# Patient Record
Sex: FEMALE | Race: OTHER | Employment: PART TIME | URBAN - METROPOLITAN AREA
[De-identification: names, ages, dates, MRNs, and addresses within clinical notes are randomized per-mention and may not be internally consistent; named-entity substitution may affect disease eponyms.]

---

## 2017-02-06 ENCOUNTER — HOSPITAL ENCOUNTER (OUTPATIENT)
Dept: GENERAL RADIOLOGY | Age: 47
Discharge: HOME | End: 2017-02-06
Payer: COMMERCIAL

## 2017-02-06 DIAGNOSIS — R52 PAIN: ICD-10-CM

## 2017-02-06 PROCEDURE — 73502 X-RAY EXAM HIP UNI 2-3 VIEWS: CPT

## 2017-03-07 ENCOUNTER — OFFICE VISIT (OUTPATIENT)
Dept: UROLOGY | Age: 47
End: 2017-03-07

## 2017-03-07 VITALS
DIASTOLIC BLOOD PRESSURE: 78 MMHG | HEART RATE: 64 BPM | HEIGHT: 61 IN | SYSTOLIC BLOOD PRESSURE: 110 MMHG | WEIGHT: 180 LBS | BODY MASS INDEX: 33.99 KG/M2

## 2017-03-07 DIAGNOSIS — R39.89 BLADDER PAIN: Primary | ICD-10-CM

## 2017-03-07 LAB
BILIRUBIN, POC: ABNORMAL
BLOOD URINE, POC: ABNORMAL
CLARITY, POC: CLEAR
COLOR, POC: YELLOW
GLUCOSE URINE, POC: ABNORMAL
KETONES, POC: ABNORMAL
LEUKOCYTE EST, POC: ABNORMAL
NITRITE, POC: ABNORMAL
PH, POC: 5.5
POST VOID RESIDUAL (PVR): 64 ML
PROTEIN, POC: ABNORMAL
SPECIFIC GRAVITY, POC: <=1.005
UROBILINOGEN, POC: 0.2

## 2017-03-07 PROCEDURE — 51798 US URINE CAPACITY MEASURE: CPT | Performed by: UROLOGY

## 2017-03-07 PROCEDURE — 81003 URINALYSIS AUTO W/O SCOPE: CPT | Performed by: UROLOGY

## 2017-03-07 PROCEDURE — 99203 OFFICE O/P NEW LOW 30 MIN: CPT | Performed by: UROLOGY

## 2017-03-07 RX ORDER — FUROSEMIDE 20 MG/1
20 TABLET ORAL
COMMUNITY
End: 2017-06-08

## 2017-03-07 RX ORDER — KETOCONAZOLE 20 MG/ML
SHAMPOO TOPICAL
COMMUNITY
Start: 2017-01-20 | End: 2017-06-08

## 2017-03-07 RX ORDER — HYDROCHLOROTHIAZIDE 12.5 MG/1
12.5 TABLET ORAL
COMMUNITY

## 2017-03-07 RX ORDER — QUINIDINE GLUCONATE 324 MG
236 TABLET, EXTENDED RELEASE ORAL
COMMUNITY

## 2017-03-07 RX ORDER — FLUOCINOLONE ACETONIDE 0.1 MG/ML
SOLUTION TOPICAL
COMMUNITY
Start: 2017-01-20

## 2017-03-07 RX ORDER — METHENAMINE, SODIUM PHOSPHATE, MONOBASIC, METHYLENE BLUE, AND HYOSCYAMINE SULFATE 81.6; 40.8; 10.8; .12 MG/1; MG/1; MG/1; MG/1
81.6 TABLET, COATED ORAL 3 TIMES DAILY PRN
Qty: 21 TABLET | Refills: 0 | COMMUNITY
Start: 2017-03-07

## 2017-03-07 RX ORDER — BIOTIN 10000 MCG
10 CAPSULE ORAL
COMMUNITY

## 2017-03-07 RX ORDER — KETOCONAZOLE 20 MG/G
CREAM TOPICAL
COMMUNITY

## 2017-03-07 RX ORDER — DOCUSATE SODIUM 100 MG/1
100 CAPSULE, LIQUID FILLED ORAL
COMMUNITY
Start: 2016-01-13

## 2017-06-08 ENCOUNTER — OFFICE VISIT (OUTPATIENT)
Dept: UROLOGY | Age: 47
End: 2017-06-08

## 2017-06-08 VITALS
SYSTOLIC BLOOD PRESSURE: 120 MMHG | BODY MASS INDEX: 38.99 KG/M2 | HEART RATE: 75 BPM | DIASTOLIC BLOOD PRESSURE: 84 MMHG | WEIGHT: 206.5 LBS | HEIGHT: 61 IN

## 2017-06-08 DIAGNOSIS — R39.89 BLADDER PAIN: Primary | ICD-10-CM

## 2017-06-08 PROCEDURE — 99213 OFFICE O/P EST LOW 20 MIN: CPT | Performed by: UROLOGY

## 2017-10-03 ENCOUNTER — HOSPITAL ENCOUNTER (EMERGENCY)
Age: 47
Discharge: HOME | End: 2017-10-03
Payer: COMMERCIAL

## 2017-10-03 VITALS
HEART RATE: 84 BPM | BODY MASS INDEX: 39.65 KG/M2 | HEIGHT: 61 IN | OXYGEN SATURATION: 100 % | RESPIRATION RATE: 18 BRPM | TEMPERATURE: 97.9 F | SYSTOLIC BLOOD PRESSURE: 120 MMHG | WEIGHT: 210 LBS | DIASTOLIC BLOOD PRESSURE: 79 MMHG

## 2017-10-03 DIAGNOSIS — M25.511 ACUTE PAIN OF RIGHT SHOULDER: Primary | ICD-10-CM

## 2017-10-03 DIAGNOSIS — M54.2 NECK PAIN ON RIGHT SIDE: ICD-10-CM

## 2017-10-03 PROCEDURE — 99282 EMERGENCY DEPT VISIT SF MDM: CPT

## 2017-10-03 RX ORDER — PREDNISONE 50 MG/1
50 TABLET ORAL DAILY
Qty: 5 TABLET | Refills: 0 | Status: SHIPPED | OUTPATIENT
Start: 2017-10-03 | End: 2017-10-08

## 2017-10-03 RX ORDER — DIAZEPAM 5 MG/1
5 TABLET ORAL EVERY 8 HOURS PRN
Qty: 8 TABLET | Refills: 0 | Status: SHIPPED | OUTPATIENT
Start: 2017-10-03 | End: 2017-10-13

## 2017-10-03 ASSESSMENT — ENCOUNTER SYMPTOMS
VOMITING: 0
NAUSEA: 0
SHORTNESS OF BREATH: 0
DIARRHEA: 0
COUGH: 0
ABDOMINAL PAIN: 0

## 2017-10-03 ASSESSMENT — PAIN DESCRIPTION - ORIENTATION: ORIENTATION: RIGHT

## 2017-10-03 ASSESSMENT — PAIN SCALES - GENERAL: PAINLEVEL_OUTOF10: 8

## 2017-10-03 ASSESSMENT — PAIN DESCRIPTION - PAIN TYPE: TYPE: ACUTE PAIN;CHRONIC PAIN

## 2017-10-03 ASSESSMENT — PAIN DESCRIPTION - LOCATION: LOCATION: NECK;SHOULDER

## 2017-10-03 ASSESSMENT — PAIN DESCRIPTION - DESCRIPTORS: DESCRIPTORS: SHARP;ACHING

## 2017-10-03 NOTE — ED TRIAGE NOTES
Pt c/o neck and right shoulder pain radiating down her right arm, dizziness, all of this started 3 weeks ago, she saw her doctor for this issue and they ordered physical therapy which she is starting October 11th. She rates the pain 8/10, she last took tylenol this morning.

## 2017-10-03 NOTE — ED AVS SNAPSHOT
Diagnoses this visit     Your diagnoses were ACUTE PAIN OF RIGHT SHOULDER and NECK PAIN ON RIGHT SIDE. Visit Information     Date of Visit Department Dept Phone    10/3/2017 Liliane Waters [phone]      You were seen by     You were seen by Martha Philip PA-C. Follow-up Appointments    Below is a list of your follow-up and future appointments. This may not be a complete list as you may have made appointments directly with providers that we are not aware of or your providers may have made some for you. Please call your providers to confirm appointments. It is important to keep your appointments. Please bring your current insurance card, photo ID, co-pay, and all medication bottles to your appointment. If self-pay, payment is expected at the time of service. Follow-up Information     Follow up with Kiesha Gibbons. Schedule an appointment as soon as possible for a visit in 1 day.     Specialty:  Nurse Practitioner    Contact information:    [address] [address]          Schedule an appointment as soon as possible for a visit with Cornelius Carpio MD.    Specialty:  Orthopedic Surgery    Contact information:    [place] [address]        Future Appointments     10/7/2017 9:30 AM     Appointment with Abhay [address] 1 at [facility] ([phone])   PREPS: * Arrive 15 minutes prior * No powders, lotions or deodorants are to be worn on the day of the exam * Limit caffeine 3 days prior * May take Ibuprofen 2-4 hours prior to appointment   [address] [address]       10/11/2017  9:00 AM     Appointment with Steven Kennedy PT at [facility] ([phone])   [address] [place] [address] Way        Date Due    HIV screening is recommended for all people regardless of risk factors aged 15-65 years at least once (lifetime) who have never been HIV tested. 2/6/1984    Tetanus Combination Vaccine (1 - Tdap) 2/6/1988    Pap Smear 2/6/1990    Yearly Flu Vaccine (1) 9/1/2017    Cholesterol Screening 9/26/2022                 Care Plan Once You Return Home    This section includes instructions you will need to follow once you leave the hospital.  Your care team will discuss these with you, so you and those caring for you know how to best care for your health needs at home. This section may also include educational information about certain health topics that may be of help to you. Important Information if you smoke or are exposed to smoking       SMOKING: QUIT SMOKING. THIS IS THE MOST IMPORTANT ACTION YOU CAN TAKE TO IMPROVE YOUR CURRENT AND FUTURE HEALTH. Call the [facility] [employer] [facility] [place] NOW ([phone])    Smoking harms nonsmokers. When nonsmokers are around people who smoke, they absorb nicotine, carbon monoxide, and other ingredients of tobacco smoke. DO NOT SMOKE AROUND CHILDREN     Children exposed to secondhand smoke are at an increased risk of:  Sudden Infant Death Syndrome (SIDS), acute respiratory infections, inflammation of the middle ear, and severe asthma. Over a longer time, it causes heart disease and lung cancer. There is no safe level of exposure to secondhand smoke. Important information for a smoker       SMOKING: QUIT SMOKING. THIS IS THE MOST IMPORTANT ACTION YOU CAN TAKE TO IMPROVE YOUR CURRENT AND FUTURE HEALTH. Call the [facility] [employer] [facility] [place] NOW ([phone])    Smoking harms nonsmokers. When nonsmokers are around people who smoke, they absorb nicotine, carbon monoxide, and other ingredients of tobacco smoke.      DO NOT SMOKE AROUND CHILDREN     Children exposed to secondhand smoke are at an increased risk of:  Sudden The medical term for this type of pain is musculoskeletal pain. It can have many different causes. Sometimes the pain is caused by an injury such as a strain or sprain. Or you might have pain from using one part of your body in the same way over and over again. This is called overuse. In some cases, the cause of the pain is another health problem such as arthritis or fibromyalgia. The doctor will examine you and ask you questions about your health to help find the cause of your pain. Blood tests or imaging tests like an X-ray may also be helpful. But sometimes doctors can't find a cause of the pain. Treatment depends on your symptoms and the cause of the pain, if known. The doctor has checked you carefully, but problems can develop later. If you notice any problems or new symptoms, get medical treatment right away. Follow-up care is a key part of your treatment and safety. Be sure to make and go to all appointments, and call your doctor if you are having problems. It's also a good idea to know your test results and keep a list of the medicines you take. How can you care for yourself at home? · Rest until you feel better. · Do not do anything that makes the pain worse. Return to exercise gradually if you feel better and your doctor says it's okay. · Be safe with medicines. Read and follow all instructions on the label. ¨ If the doctor gave you a prescription medicine for pain, take it as prescribed. ¨ If you are not taking a prescription pain medicine, ask your doctor if you can take an over-the-counter medicine. · Put ice or a cold pack on the area for 10 to 20 minutes at a time to ease pain. Put a thin cloth between the ice and your skin. When should you call for help? Call your doctor now or seek immediate medical care if:  · You have new pain, or your pain gets worse. · You have new symptoms such as a fever, a rash, or chills.   Watch closely for changes in your health, and be sure to contact your

## 2017-10-03 NOTE — ED PROVIDER NOTES
Medication List as of 10/3/2017  8:01 PM      CONTINUE these medications which have NOT CHANGED    Details   SG ENTERIC COATED ASPIRIN PO Take 81 mg by mouthHistorical Med      Biotin 10 MG CAPS Take 10 mg by mouthHistorical Med      cyanocobalamin 1000 MCG tablet Take 1,000 mcg by mouthHistorical Med      diclofenac sodium (VOLTAREN) 1 % GEL Apply topically, Topical, Until Discontinued, Historical Med      docusate sodium (COLACE) 100 MG capsule Take 100 mg by mouthHistorical Med      ferrous gluconate (FERGON) 240 (27 FE) MG tablet Take 236 mg by mouthHistorical Med      fluocinolone acetonide (SYNALAR) 0.01 % external solution Apply to affected areas on scalp 2 times per day x 2 weeks and then stop only for itching, Historical Med      fluticasone propionate (FLOVENT) 100 MCG/BLIST AEPB inhaler by Nasal routeHistorical Med      hydrochlorothiazide (HYDRODIURIL) 12.5 MG tablet Take 12.5 mg by mouthHistorical Med      ketoconazole (NIZORAL) 2 % cream Apply topically, Topical, Until Discontinued, Historical Med      potassium chloride Take by mouthHistorical Med      Methen-Hyosc-Meth Blue-Na Phos (UROGESIC-BLUE) 81.6 MG TABS Take 81.6 mg by mouth 3 times daily as needed (Bladder Spasm), Disp-21 tablet, R-0Sample      Calcium Carb-Cholecalciferol 500-400 MG-UNIT CHEW Take 1 tablet by mouthHistorical Med             ALLERGIES     Review of patient's allergies indicates no known allergies. FAMILY HISTORY     History reviewed. No pertinent family history.        SOCIAL HISTORY       Social History     Social History    Marital status: Single     Spouse name: N/A    Number of children: N/A    Years of education: N/A     Social History Main Topics    Smoking status: Former Smoker    Smokeless tobacco: None    Alcohol use No    Drug use: No    Sexual activity: No     Other Topics Concern    None     Social History Narrative       SCREENINGS             PHYSICAL EXAM    (up to 7 for level 4, 8 or more for level palpation. Patient has tried Tylenol with little to no relief. Patient is not able to use NSAIDs due to gastric bypass surgery. I'll prescribe the patient steroid and muscle relaxer. I instructed her to follow-up with her family physician in one to 2 days. I advised her to return to the emergency department for any new or worsening symptoms. Patient verbalized understanding of this plan. Patient stable and ready for discharge. PROCEDURES:  Unless otherwise noted below, none     Procedures      FINAL IMPRESSION      1. Acute pain of right shoulder    2.  Neck pain on right side          DISPOSITION/PLAN   DISPOSITION Decision to Discharge        Maryanne Hoskins (electronically signed)  Attending Emergency Physician       Stephon Florian PA-C  10/03/17 2009

## 2017-10-07 ENCOUNTER — HOSPITAL ENCOUNTER (OUTPATIENT)
Dept: WOMENS IMAGING | Age: 47
Discharge: HOME | End: 2017-10-07
Payer: COMMERCIAL

## 2017-10-07 DIAGNOSIS — Z12.31 ENCOUNTER FOR SCREENING MAMMOGRAM FOR BREAST CANCER: ICD-10-CM

## 2017-10-07 PROCEDURE — G0202 SCR MAMMO BI INCL CAD: HCPCS

## 2017-10-11 ENCOUNTER — HOSPITAL ENCOUNTER (OUTPATIENT)
Dept: PHYSICAL THERAPY | Age: 47
Setting detail: THERAPIES SERIES
Discharge: HOME | End: 2017-10-11
Payer: COMMERCIAL

## 2017-10-11 PROCEDURE — 97162 PT EVAL MOD COMPLEX 30 MIN: CPT

## 2017-10-11 PROCEDURE — 97110 THERAPEUTIC EXERCISES: CPT

## 2017-10-11 ASSESSMENT — PAIN SCALES - GENERAL: PAINLEVEL_OUTOF10: 3

## 2017-10-11 ASSESSMENT — PAIN DESCRIPTION - PROGRESSION: CLINICAL_PROGRESSION_2: GRADUALLY IMPROVING

## 2017-10-11 ASSESSMENT — PAIN DESCRIPTION - ORIENTATION
ORIENTATION: POSTERIOR;LOWER
ORIENTATION_2: RIGHT;OUTER

## 2017-10-11 ASSESSMENT — PAIN DESCRIPTION - DESCRIPTORS
DESCRIPTORS_2: ACHING
DESCRIPTORS: ACHING

## 2017-10-11 ASSESSMENT — PAIN DESCRIPTION - INTENSITY: RATING_2: 5

## 2017-10-11 ASSESSMENT — PAIN DESCRIPTION - ONSET: ONSET_2: AWAKENED FROM SLEEP

## 2017-10-11 ASSESSMENT — PAIN DESCRIPTION - LOCATION
LOCATION_2: NECK
LOCATION: HEAD

## 2017-10-11 ASSESSMENT — PAIN DESCRIPTION - PAIN TYPE
TYPE: ACUTE PAIN
TYPE_2: ACUTE PAIN

## 2017-10-11 ASSESSMENT — PAIN DESCRIPTION - DURATION: DURATION_2: CONTINUOUS

## 2017-10-11 ASSESSMENT — PAIN DESCRIPTION - FREQUENCY: FREQUENCY: INTERMITTENT

## 2017-10-11 NOTE — PROGRESS NOTES
[address] EVALUATION    [x] [address]  [] [facility] CENTER            of [address]      Date: 10/11/2017  Patient: Kira Downey  : 1970  ACCT #: [de-identified]    Referring Practitioner: Keven Pierre NP     Referral Date : 17    Diagnosis: dizziness     Treatment Diagnosis: imbalance, dizziness, neck pain   Onset Date:  (3 wks ago )  PT Insurance Information: [facility]   Total # of Visits Approved: 30 Per Physician Order       History   has a past medical history of Arthritis and Hypertension. has a past surgical history that includes Hysterectomy (2016); Gastric bypass surgery (2014); and  section (). Subjective  Subjective: Pt reports onset of Rt sided neck pain ~ 3 wks ago. then felt mild dizziness and off balance. c/o lightheadedness with mild headache. Reports blurred vision. Symptoms exacerbated with neck rotation to Rt. Worse with changing positions.     Additional Pertinent Hx: OA, HTN, gastric bypass  Pain Screening  Patient Currently in Pain: Yes  Pain Assessment  Pain Assessment: 0-10  Pain Level: 3 (Range: 0-8/10 )  Pain Type: Acute pain  Pain Location: Head  Pain Orientation: Posterior, Lower  Pain Descriptors: Aching  Pain Frequency: Intermittent  Pain Intervention(s): Medication (see eMar)  Multiple Pain Sites: Yes  Social/Functional History  Lives With: Daughter (16 yo, 24 yo )  Type of Home: House  Home Layout: One level  ADL Assistance: Independent  Homemaking Assistance: Independent  Homemaking Responsibilities: Yes  Ambulation Assistance: Independent  Transfer Assistance: Independent  Active : Yes  Mode of Transportation: Car  Occupation: Full time employment  Type of occupation:    Leisure & Hobbies: arts and crafts, movies        [place]:  Initial Episode:  3 wks ago, neck pain with off balance feeling    Testing Performed: none at this time    Dizziness Description: off balance, lightheaded     Frequency/ Duration: ~15 seconds for dizziness    Circumstance: change of position, head movements especially Rt rotation    Recent Visual Changes: blurred, difficulty focusing    Auditory Changes: denies    Medications: prednisone for neck     PAIN   Location:   Pain Location: Head  Pain Rating (0-10 pain scale):  Pain Level: 3 (Range: 0-8/10 )  Pain Description:  Pain Descriptors: Aching  Action:  [x] Acceptable for treatment  []  Other:      Objective  Vision  Vision: Impaired (difficulty focusing, blurred )  Hearing  Hearing: Within functional limits  Observation/Palpation  Posture: Good  Observation: no dysmetria noted, good dexterity   Spine  Cervical: flex 37, ext 52, Rt SB 34, Lt SB 44   Strength RLE  Comment: 4+/5 grossly   Strength LLE  Comment: 4+/5 grossly   Strength RUE  Comment: 4+/5 grossly   Strength LUE  Comment: 4+/5 grossly           PROM LLE (degrees)  LLE PROM: WFL  PROM RLE (degrees)  RLE PROM: WFL        Sensation  Overall Sensation Status: Impaired (c/o intermittent numbness \"around my face\" )     Transfers  Sit to Stand: Independent  Stand to sit:  Independent  Ambulation 1  Surface: carpet  Device: No Device  Assistance: Independent  Quality of Gait: no sig deviations   Distance: unlimited within clinic         Balance  Single Leg Stance R Leg: 3  Single Leg Stance L Leg: 10  Comments: feet together with eyes closed with sway   Jim Balance Score: 55  Dynamic Gait Total Score: 21    Orthostatic BP:   Lying 123/58, sitting 129/66, standing 126/62     Positional Vertigo:   Intensity (0-5) Nystagmus Duration   Sit -> Rt Sidelying 0 []     Rt Sidelying -> Sit 5 []  ~20\"   Sit -> Lt Sidelying 0 []     Lt Sidelying -> Sit 5 []  ~20\"     Oculomotor Exam:   NT WFL Symptoms Intensity (0-5) Nystagmus Duration   Spontaneous []  [x]    []     Smooth Pursuit []  []  Lightheaded  5 [] none noted ~30\"   Saccades []  [x]    []     VOR Cancellation [x]  []    [] Complexity  History: personal factors: stress; contributing PMH: OA, HTN, gastric bypass  Exam: musculoskeletal, pain and sensory, vestibular systems involved impacting ADLs, driving, ROM, balance   Clinical Presentation: evolving, complicated   Patient Education: vestibular systems, cervicogenic dizziness, HEP   Barriers to Learning: no   REQUIRES PT FOLLOW UP: Yes  Patient Goal:  Patient goals : \"help with the dizziness, feeling of being off balance, lightheadedness, Pain on Rt shoulder and neck\"     Treatment Plan:  [x] Adaptation [x] Habituation  [] Substitution   [x] Balance Training   [] Other:     Patient Education:  vestibular systems, cervicogenic dizziness, HEP   Pt verbalized/demonstrated good understanding:     [x] Yes         [] No, pt required further clarification. G Code:       NA    Plan:  Plan  Times per week: 2  Plan weeks: 6  Current Treatment Recommendations: Strengthening, ROM, Balance Training, Functional Mobility Training, Gait Training, Neuromuscular Re-education, Manual Therapy - Soft Tissue Mobilization, Manual Therapy - Joint Manipulation, Pain Management, Home Exercise Program, Safety Education & Training, Patient/Caregiver Education & Training, Modalities       Evaluation and patient rights have been reviewed and patient agrees with plan of care.   Yes  [x]  No  [] Explain:     Signature: Electronically signed by Leticia Talavera PT on 10/11/2017 at 10:07 AM    PT Individual Minutes  Time In: 7755  Time Out: 1000  Minutes: 57  Timed Code Treatment Minutes: 10 Minutes (8 ther ex, 2 neuro )  Procedure Minutes: 47          [place] Fall Risk Assessment    Risk Factor Scale  Score   History of Falls [] Yes  [x] No 25  0 0   Secondary Diagnosis [] Yes  [x] No 15  0 0   Ambulatory Aid [] Furniture  [] Crutches/cane/walker  [x] None/bedrest/wheelchair/nurse 30  15  0 0   IV/Heparin Lock [] Yes  [x] No 20  0 0   Gait/Transferring [] Impaired  [] Weak  [x] Normal/bedrest/immobile 20  10  0 0

## 2017-10-17 ENCOUNTER — HOSPITAL ENCOUNTER (OUTPATIENT)
Dept: PHYSICAL THERAPY | Age: 47
Setting detail: THERAPIES SERIES
Discharge: HOME | End: 2017-10-17
Payer: COMMERCIAL

## 2017-10-17 PROCEDURE — 97110 THERAPEUTIC EXERCISES: CPT

## 2017-10-17 PROCEDURE — 97112 NEUROMUSCULAR REEDUCATION: CPT

## 2017-10-17 PROCEDURE — 97140 MANUAL THERAPY 1/> REGIONS: CPT

## 2017-10-17 ASSESSMENT — PAIN DESCRIPTION - DESCRIPTORS: DESCRIPTORS: SORE;ACHING

## 2017-10-17 ASSESSMENT — PAIN SCALES - GENERAL: PAINLEVEL_OUTOF10: 7

## 2017-10-17 ASSESSMENT — PAIN DESCRIPTION - PAIN TYPE: TYPE: ACUTE PAIN

## 2017-10-17 NOTE — PROGRESS NOTES
[address] [address]  Outpatient Physical Therapy    Treatment Note        Date: 10/17/2017  Patient: Zara Minor  : 1970  ACCT #: [de-identified]  Referring Practitioner: Kale Parkinson NP   Diagnosis: dizziness     Visit Information:  PT Visit Information  Onset Date:  (3 wks ago )  PT Insurance Information: [employer]   Total # of Visits Approved: 30  Total # of Visits to Date: 2  No Show: 0  Canceled Appointment: 0  Progress Note Counter:     Subjective: Pt reports continued soreness in R UT area. No reports of dizziness but c/o lightheadedness.       HEP Compliance:  [x] Good [] Fair [] Poor [] Reports not doing due to:    Vital Signs  Patient Currently in Pain: Yes   Pain Screening  Patient Currently in Pain: Yes  Pain Assessment  Pain Level: 7  Pain Type: Acute pain  Pain Descriptors: Sore;Aching    OBJECTIVE:   Exercises  Exercise 1: smooth pursuits 30s/2 horizontal/vertical only (2/5 dizziness)  Exercise 2: chin tucks 5s/10   Exercise 3: UT stretch 30s/3   Exercise 4: levator stretch B 3/30''  Exercise 5: Barrel stretch 3/30''  Exercise 8: B/D exercises x3 bilaterally (3/5 dizzy/lightheadedness)  Exercise 9: Cervical AROM 5''x10 ea  Exercise 10: Scap retractions 5''x 10 in seated  Exercise 20: HEP: cervical AROM      Strength: [x] NT  [] MMT completed:     ROM: [x] NT  [] ROM measurements:       Manual:   Manual therapy  Manual traction: KT for R UT inhibition/decrease pain 2'  Soft Tissue Mobalization: STM to UT and paraspinals to decrease pain and tightness 8'      *Indicates exercise, modality, or manual techniques to be initiated when appropriate    Assessment:   Activity Tolerance  Activity Tolerance: Patient limited by pain, Patient Tolerated treatment well    Body structures, Functions, Activity limitations: Decreased functional mobility , Decreased strength, Decreased ROM, Decreased sensation, Decreased balance, Decreased coordination  Assessment: Initiated cervical AROM exercises to decrease pain and improve ROM. Mild reports of dizziness with vertical head movements, decreased with spotting techniques. Mild discomfort with stretching activities. VCs to decrease overpressure and maintain in pain free range. Slight improvement with spotting technique with habituation exercises. Treatment Diagnosis: imbalance, dizziness, neck pain   Prognosis: Good       Goals:  Short term goals  Time Frame for Short term goals: 2 wks   Short term goal 1: Independent with HEP   Short term goal 2: Decrease symptoms by 50% to allow change of position easily with increased confidence     Long term goals  Time Frame for Long term goals : 5 wks   Long term goal 1: Improve cervical ROM WFL to allow increased ease with driving   Long term goal 2: Smooth pursuits x60\" without increased symptoms to allow improved ability to focus   Long term goal 3: DGI 24/24 to demonstrate improved dynamic balance and decreased risk for falls   Long term goal 4: ABC >/= 95% to demonstrate improved tolerance and confidence with activities and balance   Progress toward goals: Symptoms, ROM     POST-PAIN       Pain Rating (0-10 pain scale):   6/10   Location and pain description same as pre-treatment unless indicated. Action: [] NA   [x] Perform HEP  [x] Meds as prescribed  [] Modalities as prescribed   [] Call Physician     Frequency/Duration:  Plan  Times per week: 2  Plan weeks: 6  Specific instructions for Next Treatment: Assess KT/ manual from last visit  Current Treatment Recommendations: Strengthening, ROM, Balance Training, Functional Mobility Training, Gait Training, Neuromuscular Re-education, Manual Therapy - Soft Tissue Mobilization, Manual Therapy - Joint Manipulation, Pain Management, Home Exercise Program, Safety Education & Training, Patient/Caregiver Education & Training, Modalities     Pt to continue current HEP. See objective section for any therapeutic exercise changes, additions or modifications this date. PT Individual Minutes  Time In: 0900  Time Out: 4145  Minutes: 57  Timed Code Treatment Minutes: 57 Minutes  Procedure Minutes:0  Man: 10  There ex: 30  Neuro: 17    Signature:  Electronically signed by Jayesh Pineda PTA on 10/17/17 at 12:06 PM

## 2017-10-19 ENCOUNTER — HOSPITAL ENCOUNTER (OUTPATIENT)
Dept: PHYSICAL THERAPY | Age: 47
Setting detail: THERAPIES SERIES
Discharge: HOME | End: 2017-10-19
Payer: COMMERCIAL

## 2017-10-19 PROCEDURE — 97112 NEUROMUSCULAR REEDUCATION: CPT

## 2017-10-19 PROCEDURE — 97110 THERAPEUTIC EXERCISES: CPT

## 2017-10-19 PROCEDURE — 97140 MANUAL THERAPY 1/> REGIONS: CPT

## 2017-10-19 ASSESSMENT — PAIN SCALES - GENERAL: PAINLEVEL_OUTOF10: 7

## 2017-10-19 ASSESSMENT — PAIN DESCRIPTION - DESCRIPTORS: DESCRIPTORS: SORE

## 2017-10-19 ASSESSMENT — PAIN DESCRIPTION - PAIN TYPE: TYPE: ACUTE PAIN

## 2017-10-19 ASSESSMENT — PAIN DESCRIPTION - LOCATION: LOCATION: HEAD;NECK

## 2017-10-19 NOTE — PROGRESS NOTES
[address] [address]  Outpatient Physical Therapy    Treatment Note        Date: 10/19/2017  Patient: Jackie Greenwood  : 1970  ACCT #: [de-identified]  Referring Practitioner: Carlos King NP   Diagnosis: dizziness     Visit Information:  PT Visit Information  Onset Date:  (3 wks ago )  PT Insurance Information: [employer]   Total # of Visits Approved: 30  Total # of Visits to Date: 3  No Show: 0  Canceled Appointment: 0  Progress Note Counter: 3/12    Subjective: Reports not sleeping well d/t pain. Feels KT helped a little but did not stay on very well.  Sees PCP tomorrow for follow up appt     HEP Compliance:  [x] Good [] Fair [] Poor [] Reports not doing due to:    Vital Signs  Patient Currently in Pain: Yes   Pain Screening  Patient Currently in Pain: Yes  Pain Assessment  Pain Level: 7  Pain Type: Acute pain  Pain Location: Head;Neck  Pain Descriptors: Sore    OBJECTIVE:   Exercises  Exercise 1: smooth pursuits 30s/2 horizontal/vertical only (2/5 \"lightheaded\")  Exercise 2: chin tucks 5s/10   Exercise 3: UT stretch 30s/3   Exercise 4: levator stretch B 3/30''  Exercise 5: Barrel stretch 3/30''  Exercise 6: Static stand: tandem, FT, FA, EC with min postural sway  Exercise 7: Foam: static stand EO, EC, tandem  Exercise 8: B/D exercises x3 bilaterally (3/5 dizzy/lightheadedness)  Exercise 10: Scap retractions 5''x 10 in seated  Exercise 20: HEP: B/D exerices, levator str              Strength: [x] NT  [] MMT completed:        ROM: [x] NT  [] ROM measurements:    Modalities:  Modalities  Moist heat: 10' to B UT to decrease pain and tightness     *Indicates exercise, modality, or manual techniques to be initiated when appropriate    Assessment:   Activity Tolerance  Activity Tolerance: Patient Tolerated treatment well    Body structures, Functions, Activity limitations: Decreased functional mobility , Decreased strength, Decreased ROM, Decreased sensation, Decreased balance, Decreased coordination  Assessment: Continued B/D exercises to decrease symptoms with education on targeting and avoid strenuous movement with supine to sit transfer. Min postural sway with foam activities however good righting reactions observed. Ended tx with HP to decrease tightness and pain, decrease pain reported after tx. Treatment Diagnosis: imbalance, dizziness, neck pain   Prognosis: Good  Patient Education: HEP, proper body mechanics    Goals:  Short term goals  Time Frame for Short term goals: 2 wks   Short term goal 1: Independent with HEP   Short term goal 2: Decrease symptoms by 50% to allow change of position easily with increased confidence     Long term goals  Time Frame for Long term goals : 5 wks   Long term goal 1: Improve cervical ROM WFL to allow increased ease with driving   Long term goal 2: Smooth pursuits x60\" without increased symptoms to allow improved ability to focus   Long term goal 3: DGI 24/24 to demonstrate improved dynamic balance and decreased risk for falls   Long term goal 4: ABC >/= 95% to demonstrate improved tolerance and confidence with activities and balance   Progress toward goals: Pain, cervical ROM    POST-PAIN       Pain Rating (0-10 pain scale):   5/10   Location and pain description same as pre-treatment unless indicated. Action: [] NA   [x] Perform HEP  [x] Meds as prescribed  [] Modalities as prescribed   [] Call Physician     Frequency/Duration:  Plan  Times per week: 2  Plan weeks: 6  Current Treatment Recommendations: Strengthening, ROM, Balance Training, Functional Mobility Training, Gait Training, Neuromuscular Re-education, Manual Therapy - Soft Tissue Mobilization, Manual Therapy - Joint Manipulation, Pain Management, Home Exercise Program, Safety Education & Training, Patient/Caregiver Education & Training, Modalities     Pt to continue current HEP. See objective section for any therapeutic exercise changes, additions or modifications this date.          PT Individual Minutes  Time In:

## 2017-10-24 ENCOUNTER — HOSPITAL ENCOUNTER (OUTPATIENT)
Dept: PHYSICAL THERAPY | Age: 47
Setting detail: THERAPIES SERIES
Discharge: HOME | End: 2017-10-24
Payer: COMMERCIAL

## 2017-10-24 PROCEDURE — 97110 THERAPEUTIC EXERCISES: CPT

## 2017-10-24 PROCEDURE — 97140 MANUAL THERAPY 1/> REGIONS: CPT

## 2017-10-24 PROCEDURE — 97112 NEUROMUSCULAR REEDUCATION: CPT

## 2017-10-24 ASSESSMENT — PAIN DESCRIPTION - LOCATION: LOCATION: NECK

## 2017-10-24 ASSESSMENT — PAIN DESCRIPTION - DESCRIPTORS: DESCRIPTORS: ACHING

## 2017-10-24 ASSESSMENT — PAIN SCALES - GENERAL: PAINLEVEL_OUTOF10: 7

## 2017-10-24 ASSESSMENT — PAIN DESCRIPTION - ORIENTATION: ORIENTATION: RIGHT

## 2017-10-24 NOTE — PROGRESS NOTES
[id number]  Outpatient Physical Therapy    Treatment Note        Date: 10/24/2017  Patient: Patricia Becerra  : 1970  ACCT #: [de-identified]  Referring Practitioner: Sumanth Bunch NP   Diagnosis: dizziness     Visit Information:  PT Visit Information  Onset Date:  (3 wks ago )  PT Insurance Information: [facility]   Total # of Visits Approved: 30  Progress Note Counter:     Subjective: Pt reports rt UT AND cervical pain, bad weekend for pain and sleeping, also reports falling on stairs when trying to turn to fast at work. HEP Compliance:  [x] Good [] Fair [] Poor [] Reports not doing due to:    Vital Signs  Patient Currently in Pain: Yes   Pain Screening  Patient Currently in Pain: Yes  Pain Assessment  Pain Level: 7  Pain Location: Neck  Pain Orientation: Right  Pain Descriptors: Aching (pinching)    OBJECTIVE:   Exercises  Exercise 1: smooth pursuits 30s/3 horizontal/vertical only (2/5 \"lightheaded\")  Exercise 2: chin tucks 5s/10   Exercise 3: UT stretch 30s/3 B  Exercise 4: levator stretch B 3/30''  Exercise 5: Barrel stretch 3/30''  Exercise 7: Foam: static stand EO, EC, tandem and with head turns  Exercise 8: B/D exercises x3 bilaterally ( dizzy/lightheadedness, to the rt, rolling from supine to S/L)not as bad to the lt  Exercise 9: Cervical AROM 5''x10 ea  Exercise 10: Scap retractions 5''x 10 in seated  Exercise 11: tuning board with horizontal smooth pursuits, min sway    Strength: [x] NT  [] MMT completed:     ROM: [x] NT  [] ROM measurements:      Manual:   Manual therapy  Soft Tissue Mobalization: STM to UT and paraspinals to decrease pain and tightness 8', inc pain and HA withflexion    Modalities:  Modalities  Moist heat: 10' to B UT to decrease pain and tightness     *Indicates exercise, modality, or manual techniques to be initiated when appropriate    Assessment:       Body structures, Functions, Activity limitations: Decreased functional mobility , Decreased strength, Decreased ROM, Decreased sensation, Decreased balance, Decreased coordination  Assessment: Pt demo's decreasing sx's with BD ex's, Rt >Lt,however continued with supine to sit, vc's for decreased speed/ technique,  demo'd increased dizziness with smooth pursuits with increased speed,trialed compliant surface with smooth pursuits with  minimnal sway. 90deg turns with compensatory strategies with decreased dizziness. Pt also c/o of pending HA during manual with flexion, improved with head unsupported on mat. Treatment Diagnosis: imbalance, dizziness, neck pain      Goals:  Short term goals  Time Frame for Short term goals: 2 wks   Short term goal 1: Independent with HEP   Short term goal 2: Decrease symptoms by 50% to allow change of position easily with increased confidence     Long term goals  Time Frame for Long term goals : 5 wks   Long term goal 1: Improve cervical ROM WFL to allow increased ease with driving   Long term goal 2: Smooth pursuits x60\" without increased symptoms to allow improved ability to focus   Long term goal 3: DGI 24/24 to demonstrate improved dynamic balance and decreased risk for falls   Long term goal 4: ABC >/= 95% to demonstrate improved tolerance and confidence with activities and balance   Progress toward goals:ROM , strength, reducing sx's    POST-PAIN       Pain Rating (0-10 pain scale):  3 /10   Location and pain description same as pre-treatment unless indicated.    Action: [] NA   [x] Perform HEP  [] Meds as prescribed  [x] Modalities as prescribed   [] Call Physician     Frequency/Duration:  Plan  Times per week: 2  Plan weeks: 6  Current Treatment Recommendations: Strengthening, ROM, Balance Training, Functional Mobility Training, Gait Training, Neuromuscular Re-education, Manual Therapy - Soft Tissue Mobilization, Manual Therapy - Joint Manipulation, Pain Management, Home Exercise Program, Safety Education & Training, Patient/Caregiver Education & Training, Modalities     Pt to

## 2017-10-26 ENCOUNTER — HOSPITAL ENCOUNTER (OUTPATIENT)
Dept: PHYSICAL THERAPY | Age: 47
Setting detail: THERAPIES SERIES
Discharge: HOME | End: 2017-10-26
Payer: COMMERCIAL

## 2017-10-26 PROCEDURE — 97110 THERAPEUTIC EXERCISES: CPT

## 2017-10-26 PROCEDURE — 97140 MANUAL THERAPY 1/> REGIONS: CPT

## 2017-10-26 PROCEDURE — 97112 NEUROMUSCULAR REEDUCATION: CPT

## 2017-10-26 PROCEDURE — G0283 ELEC STIM OTHER THAN WOUND: HCPCS

## 2017-10-26 ASSESSMENT — PAIN DESCRIPTION - LOCATION: LOCATION: NECK

## 2017-10-26 ASSESSMENT — PAIN DESCRIPTION - PAIN TYPE: TYPE: ACUTE PAIN

## 2017-10-26 ASSESSMENT — PAIN SCALES - GENERAL: PAINLEVEL_OUTOF10: 4

## 2017-10-26 ASSESSMENT — PAIN DESCRIPTION - DESCRIPTORS: DESCRIPTORS: ACHING

## 2017-10-26 ASSESSMENT — PAIN DESCRIPTION - ORIENTATION: ORIENTATION: RIGHT

## 2017-10-31 ENCOUNTER — HOSPITAL ENCOUNTER (OUTPATIENT)
Dept: PHYSICAL THERAPY | Age: 47
Setting detail: THERAPIES SERIES
Discharge: HOME | End: 2017-10-31
Payer: COMMERCIAL

## 2017-10-31 PROCEDURE — 97140 MANUAL THERAPY 1/> REGIONS: CPT

## 2017-10-31 PROCEDURE — 97112 NEUROMUSCULAR REEDUCATION: CPT

## 2017-10-31 PROCEDURE — 97110 THERAPEUTIC EXERCISES: CPT

## 2017-10-31 ASSESSMENT — PAIN DESCRIPTION - DESCRIPTORS: DESCRIPTORS: ACHING

## 2017-10-31 ASSESSMENT — PAIN DESCRIPTION - LOCATION: LOCATION: NECK

## 2017-10-31 ASSESSMENT — PAIN SCALES - GENERAL: PAINLEVEL_OUTOF10: 7

## 2017-10-31 NOTE — PROGRESS NOTES
[facility]   Outpatient Physical Therapy   Treatment Note  [x] [address]  [] [facility]  Date: 10/31/2017  Patient: Gladys Blanca  : 1970  ACCT #: [de-identified]  Referring Practitioner: Marybelle Denver, NP   Diagnosis: dizziness     Visit Information:  PT Visit Information  Onset Date:  (3 wks ago )  [facility]   Total # of Visits Approved: 30  Total # of Visits to Date: 6  No Show: 0  Canceled Appointment: 0  Progress Note Counter:     SUBJECTIVE:   Subjective  Subjective: Pt reports 50% improvement with balance and dizziness. Primary concern is neck pain this date. HEP Compliance:  [x] Good [] Fair [] Poor [] Reports not doing due to:    PAIN   Location:   Pain Location: Neck (posterior and Rt side )  Pain Rating (0-10 pain scale):  Pain Level: 7  Pain Description:  Pain Descriptors: Aching  Action:  [x] Acceptable for treatment  []  Other:    OBJECTIVE:   Exercises  Exercise 1: smooth pursuits 30s horizontal with good quality and without increased symptoms, vertical with good tracking with 2/5 symptoms   Exercise 2: chin tucks 5s/10, with nod x10   Exercise 3: UT stretch 30s/3 B with VCs for neutral rotation   Exercise 4: levator stretch B 3/30''  Exercise 5: Barrel stretch 3/30''  Exercise 10: Scap retractions 5''x 10 in seated  Exercise 14:  VOR 30s with VCs to decrease range, 1/5 dizziness with correct technique 30s/2   Exercise 20: HEP: continue current, add vertical smooth pursuits     Manual: []  NA   Manual therapy  PROM: PROM in supine   Muscle energy: METs to R to increase SB  Soft Tissue Mobalization: STM to R UT to decrease pain and tightness, suboccipital release, MFR Rt UT and SCM     Modalities: [x]  NA    Mobility: [x]  NA    Strength: [x] NT    ROM: [] NT   Spine  Cervical: flex 34, ext 51, Rt SB 43, Lt SB 45      HEP  Continue with current Home Exercise Program.  See Objective section

## 2017-11-02 ENCOUNTER — HOSPITAL ENCOUNTER (OUTPATIENT)
Dept: PHYSICAL THERAPY | Age: 47
Setting detail: THERAPIES SERIES
Discharge: HOME | End: 2017-11-02
Payer: COMMERCIAL

## 2017-11-02 PROCEDURE — G0283 ELEC STIM OTHER THAN WOUND: HCPCS

## 2017-11-02 PROCEDURE — 97112 NEUROMUSCULAR REEDUCATION: CPT

## 2017-11-02 PROCEDURE — 97140 MANUAL THERAPY 1/> REGIONS: CPT

## 2017-11-02 ASSESSMENT — PAIN DESCRIPTION - PAIN TYPE: TYPE: ACUTE PAIN

## 2017-11-02 ASSESSMENT — PAIN DESCRIPTION - LOCATION: LOCATION: NECK

## 2017-11-02 ASSESSMENT — PAIN DESCRIPTION - ORIENTATION: ORIENTATION: RIGHT

## 2017-11-02 ASSESSMENT — PAIN DESCRIPTION - DESCRIPTORS: DESCRIPTORS: ACHING

## 2017-11-02 ASSESSMENT — PAIN SCALES - GENERAL: PAINLEVEL_OUTOF10: 6

## 2017-11-02 NOTE — PROGRESS NOTES
Modalities     Pt to continue current HEP. See objective section for any therapeutic exercise changes, additions or modifications this date.          PT Individual Minutes  Time In: 0901  Time Out: 1004  Minutes: 63  Timed Code Treatment Minutes: 53 Minutes  Procedure Minutes: 10  Neuro: 30  Man: 23    Signature:  Electronically signed by Ismael Urrutia PTA on 11/2/17 at 10:07 AM

## 2017-11-07 ENCOUNTER — HOSPITAL ENCOUNTER (OUTPATIENT)
Dept: PHYSICAL THERAPY | Age: 47
Setting detail: THERAPIES SERIES
Discharge: HOME | End: 2017-11-07
Payer: COMMERCIAL

## 2017-11-07 PROCEDURE — 97112 NEUROMUSCULAR REEDUCATION: CPT

## 2017-11-07 PROCEDURE — 97140 MANUAL THERAPY 1/> REGIONS: CPT

## 2017-11-07 PROCEDURE — 97110 THERAPEUTIC EXERCISES: CPT

## 2017-11-07 ASSESSMENT — PAIN SCALES - GENERAL: PAINLEVEL_OUTOF10: 4

## 2017-11-07 ASSESSMENT — PAIN DESCRIPTION - DESCRIPTORS: DESCRIPTORS: ACHING

## 2017-11-07 ASSESSMENT — PAIN DESCRIPTION - ORIENTATION: ORIENTATION: POSTERIOR;RIGHT

## 2017-11-07 ASSESSMENT — PAIN DESCRIPTION - PAIN TYPE: TYPE: ACUTE PAIN

## 2017-11-07 ASSESSMENT — PAIN DESCRIPTION - LOCATION: LOCATION: NECK

## 2017-11-07 NOTE — PROGRESS NOTES
treatment well    Body structures, Functions, Activity limitations: Decreased functional mobility , Decreased strength, Decreased ROM, Decreased sensation, Decreased balance, Decreased coordination  Assessment: Pt continues to report min pain with cervical flexion activities. Improved dynamic balance with DGI tasks and higher level gait drills. VCs to stay within pain free range with AROM exercises, specifically with flexion. Treatment Diagnosis: imbalance, dizziness, neck pain   Prognosis: Good       Goals:  Short term goals  Time Frame for Short term goals: 2 wks   Short term goal 1: Independent with HEP   Short term goal 2: Decrease symptoms by 50% to allow change of position easily with increased confidence     Long term goals  Time Frame for Long term goals : 5 wks   Long term goal 1: Improve cervical ROM WFL to allow increased ease with driving   Long term goal 2: Smooth pursuits x60\" without increased symptoms to allow improved ability to focus   Long term goal 3: DGI 24/24 to demonstrate improved dynamic balance and decreased risk for falls   Long term goal 4: ABC >/= 95% to demonstrate improved tolerance and confidence with activities and balance   Progress toward goals: Pain    POST-PAIN       Pain Rating (0-10 pain scale):   2/10   Location and pain description same as pre-treatment unless indicated. Action: [] NA   [x] Perform HEP  [x] Meds as prescribed  [] Modalities as prescribed   [] Call Physician     Frequency/Duration:  Plan  Times per week: 2  Plan weeks: 6  Current Treatment Recommendations: Strengthening, ROM, Balance Training, Functional Mobility Training, Gait Training, Neuromuscular Re-education, Manual Therapy - Soft Tissue Mobilization, Manual Therapy - Joint Manipulation, Pain Management, Home Exercise Program, Safety Education & Training, Patient/Caregiver Education & Training, Modalities     Pt to continue current HEP.   See objective section for any therapeutic exercise changes, additions or modifications this date.          PT Individual Minutes  Time In: 1720  Time Out: 1009  Minutes: 66  Timed Code Treatment Minutes: 56 Minutes  Procedure Minutes: 10  Man: 15  There ex: 30  Neuro: 11    Signature:  Electronically signed by Elizabeth Marcial PTA on 11/7/17 at 9:59 AM

## 2017-11-09 ENCOUNTER — HOSPITAL ENCOUNTER (OUTPATIENT)
Dept: PHYSICAL THERAPY | Age: 47
Setting detail: THERAPIES SERIES
Discharge: HOME | End: 2017-11-09
Payer: COMMERCIAL

## 2017-11-09 PROCEDURE — 97110 THERAPEUTIC EXERCISES: CPT

## 2017-11-09 PROCEDURE — 97140 MANUAL THERAPY 1/> REGIONS: CPT

## 2017-11-09 PROCEDURE — 97112 NEUROMUSCULAR REEDUCATION: CPT

## 2017-11-09 ASSESSMENT — PAIN SCALES - GENERAL: PAINLEVEL_OUTOF10: 4

## 2017-11-09 ASSESSMENT — PAIN DESCRIPTION - PAIN TYPE: TYPE: ACUTE PAIN

## 2017-11-09 ASSESSMENT — PAIN DESCRIPTION - DESCRIPTORS: DESCRIPTORS: ACHING

## 2017-11-09 ASSESSMENT — PAIN DESCRIPTION - LOCATION: LOCATION: NECK

## 2017-11-09 ASSESSMENT — PAIN DESCRIPTION - ORIENTATION: ORIENTATION: RIGHT

## 2017-11-09 NOTE — PROGRESS NOTES
Decreased coordination  Assessment: Initiated smooth pursuits and VOR on tuning board to challenge balance. Min postural sway noted but not significantly. Continued head turns for DGI tasks with good tolerance. Treatment Diagnosis: imbalance, dizziness, neck pain   Prognosis: Good       Goals:  Short term goals  Time Frame for Short term goals: 2 wks   Short term goal 1: Independent with HEP   Short term goal 2: Decrease symptoms by 50% to allow change of position easily with increased confidence     Long term goals  Time Frame for Long term goals : 5 wks   Long term goal 1: Improve cervical ROM WFL to allow increased ease with driving   Long term goal 2: Smooth pursuits x60\" without increased symptoms to allow improved ability to focus   Long term goal 3: DGI 24/24 to demonstrate improved dynamic balance and decreased risk for falls   Long term goal 4: ABC >/= 95% to demonstrate improved tolerance and confidence with activities and balance   Progress toward goals: Pain    POST-PAIN       Pain Rating (0-10 pain scale):   2/10   Location and pain description same as pre-treatment unless indicated. Action: [] NA   [] Perform HEP  [x] Meds as prescribed  [] Modalities as prescribed   [] Call Physician     Frequency/Duration:  Plan  Times per week: 2  Plan weeks: 6  Current Treatment Recommendations: Strengthening, ROM, Balance Training, Functional Mobility Training, Gait Training, Neuromuscular Re-education, Manual Therapy - Soft Tissue Mobilization, Manual Therapy - Joint Manipulation, Pain Management, Home Exercise Program, Safety Education & Training, Patient/Caregiver Education & Training, Modalities     Pt to continue current HEP. See objective section for any therapeutic exercise changes, additions or modifications this date.          PT Individual Minutes  Time In: 6975  Time Out: 1011  Minutes: 67  Timed Code Treatment Minutes: 56 Minutes  Procedure Minutes: 10  Man: 20  There ex: 10  Neuro: 26    Signature:  Electronically signed by Richy Diaz PTA on 11/9/17 at 9:53 AM

## 2017-11-14 ENCOUNTER — HOSPITAL ENCOUNTER (OUTPATIENT)
Dept: PHYSICAL THERAPY | Age: 47
Setting detail: THERAPIES SERIES
Discharge: HOME | End: 2017-11-14
Payer: COMMERCIAL

## 2017-11-14 PROCEDURE — G0283 ELEC STIM OTHER THAN WOUND: HCPCS

## 2017-11-14 PROCEDURE — 97140 MANUAL THERAPY 1/> REGIONS: CPT

## 2017-11-14 PROCEDURE — 97110 THERAPEUTIC EXERCISES: CPT

## 2017-11-14 ASSESSMENT — PAIN SCALES - GENERAL: PAINLEVEL_OUTOF10: 4

## 2017-11-14 ASSESSMENT — PAIN DESCRIPTION - PAIN TYPE: TYPE: ACUTE PAIN

## 2017-11-14 ASSESSMENT — PAIN DESCRIPTION - LOCATION: LOCATION: NECK

## 2017-11-14 ASSESSMENT — PAIN DESCRIPTION - DESCRIPTORS: DESCRIPTORS: ACHING

## 2017-11-14 ASSESSMENT — PAIN DESCRIPTION - ORIENTATION: ORIENTATION: RIGHT

## 2017-11-14 NOTE — PROGRESS NOTES
strength, Decreased ROM, Decreased sensation, Decreased balance, Decreased coordination  Assessment: Pt continues to report difficulties with R rotation and flexion activities. Increased resistance to Lats/rows with VCs to improve upright posture. Info given regarding IDN as pt displays symptoms that could be relieved to improve function. Treatment Diagnosis: imbalance, dizziness, neck pain   Prognosis: Good       Goals:  Short term goals  Time Frame for Short term goals: 2 wks   Short term goal 1: Independent with HEP   Short term goal 2: Decrease symptoms by 50% to allow change of position easily with increased confidence     Long term goals  Time Frame for Long term goals : 5 wks   Long term goal 1: Improve cervical ROM WFL to allow increased ease with driving   Long term goal 2: Smooth pursuits x60\" without increased symptoms to allow improved ability to focus   Long term goal 3: DGI 24/24 to demonstrate improved dynamic balance and decreased risk for falls   Long term goal 4: ABC >/= 95% to demonstrate improved tolerance and confidence with activities and balance   Progress toward goals: Pain    POST-PAIN       Pain Rating (0-10 pain scale):   3/10   Location and pain description same as pre-treatment unless indicated. Action: [] NA   [] Perform HEP  [x] Meds as prescribed  [] Modalities as prescribed   [] Call Physician     Frequency/Duration:  Plan  Times per week: 2  Plan weeks: 6  Current Treatment Recommendations: Strengthening, ROM, Balance Training, Functional Mobility Training, Gait Training, Neuromuscular Re-education, Manual Therapy - Soft Tissue Mobilization, Manual Therapy - Joint Manipulation, Pain Management, Home Exercise Program, Safety Education & Training, Patient/Caregiver Education & Training, Modalities     Pt to continue current HEP. See objective section for any therapeutic exercise changes, additions or modifications this date.          PT Individual Minutes  Time In: 0900  Time Out:

## 2017-11-16 ENCOUNTER — HOSPITAL ENCOUNTER (OUTPATIENT)
Dept: PHYSICAL THERAPY | Age: 47
Setting detail: THERAPIES SERIES
Discharge: HOME | End: 2017-11-16
Payer: COMMERCIAL

## 2017-11-16 PROCEDURE — G0283 ELEC STIM OTHER THAN WOUND: HCPCS

## 2017-11-16 PROCEDURE — 97140 MANUAL THERAPY 1/> REGIONS: CPT

## 2017-11-16 PROCEDURE — 97110 THERAPEUTIC EXERCISES: CPT

## 2017-11-16 ASSESSMENT — PAIN SCALES - GENERAL: PAINLEVEL_OUTOF10: 4

## 2017-11-16 ASSESSMENT — PAIN DESCRIPTION - ORIENTATION: ORIENTATION: RIGHT

## 2017-11-16 ASSESSMENT — PAIN DESCRIPTION - PAIN TYPE: TYPE: ACUTE PAIN

## 2017-11-16 ASSESSMENT — PAIN DESCRIPTION - DESCRIPTORS: DESCRIPTORS: ACHING

## 2017-11-16 ASSESSMENT — PAIN DESCRIPTION - LOCATION: LOCATION: NECK

## 2017-11-16 NOTE — PROGRESS NOTES
[address] [address]  Outpatient Physical Therapy    Treatment Note        Date: 2017  Patient: Porsche Olmedo  : 1970  ACCT #: [de-identified]  Referring Practitioner: Neel Decker NP   Diagnosis: dizziness     Visit Information:  PT Visit Information  Onset Date:  (3 wks ago )  PT Insurance Information: [employer]   Total # of Visits Approved: 30  Total # of Visits to Date: 11  No Show: 0  Canceled Appointment: 0  Progress Note Counter:     Subjective: Pt reports not dizziness/imbalance with laying down/getting up. HEP Compliance:  [x] Good [] Fair [] Poor [] Reports not doing due to:    Vital Signs  Patient Currently in Pain: Yes   Pain Screening  Patient Currently in Pain: Yes  Pain Assessment  Pain Level: 4  Pain Type: Acute pain  Pain Location: Neck  Pain Orientation: Right  Pain Descriptors: Aching    OBJECTIVE:   Exercises  Exercise 1: smooth pursuits 30s V/H in standing  Exercise 2: Cervical retraction with ext 5''x10, chin nod with rotation 3''x10  Exercise 5: Barrel stretch 330''  Exercise 12: Lats/rows RTB x10  Exercise 14: VOR x1 30''/2 Vand H in stand (1/5 dizzy); VCs to decrease head movement speed and range  Exercise 15: Corner stretch 330''  Exercise 16: Chest pulls 3 way YTB x12  Exercise 18:  Shoulder rolls F/R x10  Exercise 19: VOR x2 horizontal only (2/5 dizzy)  Exercise 20: HEP: lats/rows         Strength: [x] NT  [] MMT completed:     ROM: [] NT  [x] ROM measurements:      Spine  Cervical: flex 30 with pain, ext 54deg, R SB 42, L SB 39  Manual:   Manual therapy  Soft Tissue Mobalization: MFR to B UT, STM cervical paraspinals, light distraction  IDN to normalize tissue dysfunction and improve NM mechanics (see homeostatic chart to be scanned into EMR upon D/C for points needled) x10 min     Modalities:  Modalities  Moist heat: 10' to B UT to decrease pain and tightness  E-stim (parameters): Estim to cervical 10' with HP in seated to decrease pain     *Indicates exercise, modality, or manual techniques to be initiated when appropriate    Assessment: Body structures, Functions, Activity limitations: Decreased functional mobility , Decreased strength, Decreased ROM, Decreased sensation, Decreased balance, Decreased coordination  Assessment: Initiated VOR x2 with mild symptoms. VCs to keep exercises and stretches within pain free and symptom free range. ROM continues to be limited by pain. Treatment Diagnosis: imbalance, dizziness, neck pain   Prognosis: Good       Goals:  Short term goals  Time Frame for Short term goals: 2 wks   Short term goal 1: Independent with HEP   Short term goal 2: Decrease symptoms by 50% to allow change of position easily with increased confidence     Long term goals  Time Frame for Long term goals : 5 wks   Long term goal 1: Improve cervical ROM WFL to allow increased ease with driving   Long term goal 2: Smooth pursuits x60\" without increased symptoms to allow improved ability to focus   Long term goal 3: DGI 24/24 to demonstrate improved dynamic balance and decreased risk for falls   Long term goal 4: ABC >/= 95% to demonstrate improved tolerance and confidence with activities and balance   Progress toward goals: ROM    POST-PAIN       Pain Rating (0-10 pain scale):   3/10   Location and pain description same as pre-treatment unless indicated. Action: [] NA   [x] Perform HEP  [x] Meds as prescribed  [] Modalities as prescribed   [] Call Physician     Frequency/Duration:  Plan  Times per week: 2  Plan weeks: 6  Current Treatment Recommendations: Strengthening, ROM, Balance Training, Functional Mobility Training, Gait Training, Neuromuscular Re-education, Manual Therapy - Soft Tissue Mobilization, Manual Therapy - Joint Manipulation, Pain Management, Home Exercise Program, Safety Education & Training, Patient/Caregiver Education & Training, Modalities     Pt to continue current HEP.   See objective section for any therapeutic exercise changes, additions or modifications this date.          PT Individual Minutes  Time In: 0900  Time Out: 1010  Minutes: 70  Timed Code Treatment Minutes: 60 Minutes  Procedure Minutes: 10  There ex: 25  Man: 20     Signature:  Electronically signed by Bruce Ram PTA on 11/16/17 at 9:12 AM

## 2017-11-22 ENCOUNTER — HOSPITAL ENCOUNTER (OUTPATIENT)
Dept: PHYSICAL THERAPY | Age: 47
Setting detail: THERAPIES SERIES
Discharge: HOME | End: 2017-11-22
Payer: COMMERCIAL

## 2017-11-22 PROCEDURE — 97140 MANUAL THERAPY 1/> REGIONS: CPT

## 2017-11-22 PROCEDURE — 97110 THERAPEUTIC EXERCISES: CPT

## 2017-11-22 ASSESSMENT — PAIN SCALES - GENERAL: PAINLEVEL_OUTOF10: 2

## 2017-11-22 ASSESSMENT — PAIN DESCRIPTION - LOCATION: LOCATION: NECK

## 2017-11-22 ASSESSMENT — PAIN DESCRIPTION - ORIENTATION: ORIENTATION: RIGHT

## 2017-11-22 NOTE — PROGRESS NOTES
[address]     Ph: [phone]  Fax: [phone]    [] Certification  [] Recertification []  Plan of Care  [x] Progress Note [] Discharge      To:  Referring Practitioner: Patricio Canavan, NP       From:  Jose Garcia, PT  Patient: Ondina David     : 1970  Diagnosis: dizziness, neck pain on R side     Date: 2017  Treatment Diagnosis: imbalance, dizziness, neck pain      Progress Report Period from:  10/11/2017  to 2017    Total # of Visits to Date: 12   No Show: 0    Canceled Appointment: 0     OBJECTIVE:   Short Term Goals - Time Frame for Short term goals: 2 wks     Goals Current/Discharge status  Met   Short term goal 1: Independent with HEP   ongoing [] yes  [x] no   Short term goal 2: Decrease symptoms by 50% to allow change of position easily with increased confidence  (D/C 17)  > 50% [x] yes  [] no   Short term goal 3: The pt will have a decrease in NDI score >/=4 points in order to increase functional activity tolerance   [] yes  [x] no   Short term goal 4: The pt will demonstrate improved B periscapular strength >/=4+/5 in order to increase postural awareness in sitting   Strength DIANDRA  Comment: MT/LT 4/5   Strength PATRICK  Comment: MT/LT 4/5    [] yes  [x] no   Short term goal 5:  The pt will demo improved deep neck flexor endurance >/=10 seconds in order to decrease pain with functional activity Other: Deep neck flexor endurance 6 sec  []  yes  [x]  no     Long Term Goals - Time Frame for Long term goals : 5 wks   Goals Current/ Discharge status Met   Long term goal 1: Improve cervical ROM WFL to allow increased ease with driving  Spine  Cervical: flex 45, ext 40, SB L 45, R 40, Rot R 65 L 60      [] yes  [x] no   Long term goal 2: Smooth pursuits x60\" without increased symptoms to allow improved ability to focus  (D/C 17) WFL [x] yes  [] no   Long term goal 3: DGI  to demonstrate improved dynamic balance and decreased risk for falls  (D/C 11/22/17 ) 24/24 [x] yes  [] no   Long term goal 4: ABC >/= 95% to demonstrate improved tolerance and confidence with activities and balance  (D/C 11/22/17) 98.13% [x] yes  [] no     Body structures, Functions, Activity limitations: Decreased functional mobility , Decreased strength, Decreased ROM, Decreased sensation, Decreased balance, Decreased coordination  Assessment: Pt has met all goals for dizziness at this time however conts to have R sided neck pain with decreased cervical arom, periscapular and deep neck flexor strength, and postural awareness. These impairments cont to limit her functional abilities by 12% including her abilities to perfrom sleeping, driving, and ADL's without pain or limiattions. Prognosis: Good  Discharge Recommendations: Continue to assess pending progress    PLAN:   Frequency/Duration:  Plan  Times per week: 1-2  Plan weeks: 3 additional   Current Treatment Recommendations: Strengthening, ROM, Balance Training, Functional Mobility Training, Gait Training, Neuromuscular Re-education, Manual Therapy - Soft Tissue Mobilization, Manual Therapy - Joint Manipulation, Pain Management, Home Exercise Program, Safety Education & Training, Patient/Caregiver Education & Training, Modalities (IDN)             ? Patient Status:[x] Continue/ Initiate plan of Care    [] Discharge PT. Recommend pt continue with HEP. [] Additional visits requested, Please re-certify for additional visits:       Signature: Electronically signed by Melvina Cobb PT on 11/22/17 at 12:08 PM    If you have any questions or concerns, please don't hesitate to call. Thank you for your referral.    I have reviewed this plan of care and certify a need for medically necessary rehabilitation services.     Physician Signature:__________________________________________________________  Date:  Please sign and return

## 2017-11-22 NOTE — PROGRESS NOTES
[address] [address]  Outpatient Physical Therapy    Treatment Note        Date: 2017  Patient: Jackie Greenwood  : 1970  ACCT #: [de-identified]  Referring Practitioner: Carlos King NP   Diagnosis: dizziness, neck pain on R side    Visit Information:  PT Visit Information  Onset Date:  (3 wks ago )  PT Insurance Information: [employer]   Total # of Visits Approved: 30  Total # of Visits to Date: 12  No Show: 0  Canceled Appointment: 0  Progress Note Counter: +3-6=15-18    Subjective: Pt reports not having any dizziness, feeling much better, conts to have minimal cervical pain that was somewhat relieved after last tx session. HEP Compliance:  [x] Good [] Fair [] Poor [] Reports not doing due to:    Vital Signs  Patient Currently in Pain: Yes   Pain Screening  Patient Currently in Pain: Yes  Pain Assessment  Pain Assessment: 0-10  Pain Level: 2  Pain Location: Neck  Pain Orientation: Right    OBJECTIVE:     Strength: [] NT  [x] MMT completed:  Strength RUE  Comment: MT/LT 4/5   Strength LUE  Comment: MT/LT 4/5   Strength Other  Other: Deep neck flexor endurance 6 sec     ROM: [] NT  [x] ROM measurements:  Spine  Cervical: flex 45, ext 40, SB L 45, R 40, Rot R 65 L 60     Manual:   Manual therapy  Soft Tissue Mobalization: STM cervical paraspinals, SCM's, UT's, LS, sub-occipital release; IDN to normalize tissue dysfunction and improve NM mechanics (see homeostatic chart to be scanned into EMR upon D/C for points needled) x10 min; Total manual 23 min     *Indicates exercise, modality, or manual techniques to be initiated when appropriate    Assessment:    Body structures, Functions, Activity limitations: Decreased functional mobility , Decreased strength, Decreased ROM, Decreased sensation, Decreased balance, Decreased coordination  Assessment: Pt has met all goals for dizziness at this time however conts to have R sided neck pain with decreased cervical arom, periscapular and deep neck flexor strength, and postural awareness. These impairments cont to limit her functional abilities by 12% including her abilities to perfrom sleeping, driving, and ADL's without pain or limiattions. Treatment Diagnosis: imbalance, dizziness, neck pain   Prognosis: Good     Goals:  Short term goals  Time Frame for Short term goals: 2 wks   Short term goal 1: Independent with HEP   Short term goal 2: Decrease symptoms by 50% to allow change of position easily with increased confidence  (D/C 11/22/17)  Short term goal 3: The pt will have a decrease in NDI score >/=4 points in order to increase functional activity tolerance  Short term goal 4: The pt will demonstrate improved B periscapular strength >/=4+/5 in order to increase postural awareness in sitting   Short term goal 5: The pt will demo improved deep neck flexor endurance >/=10 seconds in order to decrease pain with functional activity    Long term goals  Time Frame for Long term goals : 5 wks   Long term goal 1: Improve cervical ROM WFL to allow increased ease with driving   Long term goal 2: Smooth pursuits x60\" without increased symptoms to allow improved ability to focus  (D/C 11/22/17)  Long term goal 3: DGI 24/24 to demonstrate improved dynamic balance and decreased risk for falls  (D/C 11/22/17 )  Long term goal 4: ABC >/= 95% to demonstrate improved tolerance and confidence with activities and balance  (D/C 11/22/17)  Progress toward goals: see PN    POST-PAIN       Pain Rating (0-10 pain scale): 2/10   Location and pain description same as pre-treatment unless indicated.    Action: [] NA   [x] Perform HEP  [x] Meds as prescribed  [x] Modalities as prescribed   [] Call Physician     Frequency/Duration:  Plan  Times per week: 1-2  Plan weeks: 3 additional   Specific instructions for Next Treatment: cont with IDN  Current Treatment Recommendations: Strengthening, ROM, Balance Training, Functional Mobility Training, Gait Training, Neuromuscular Re-education, Manual Therapy - Soft Tissue Mobilization, Manual Therapy - Joint Manipulation, Pain Management, Home Exercise Program, Safety Education & Training, Patient/Caregiver Education & Training, Modalities (IDN)     Pt to continue current HEP. See objective section for any therapeutic exercise changes, additions or modifications this date.     PT Individual Minutes  Time In: 1119  Time Out: 1201  Minutes: 38  Timed Code Treatment Minutes: 38 Minutes  Procedure Minutes: n/a    Signature:  Electronically signed by Jacqueline Vargas PT on 11/22/17 at 12:12 PM

## 2017-11-24 ENCOUNTER — HOSPITAL ENCOUNTER (OUTPATIENT)
Dept: PHYSICAL THERAPY | Age: 47
Setting detail: THERAPIES SERIES
Discharge: HOME | End: 2017-11-24
Payer: COMMERCIAL

## 2017-11-24 NOTE — PROGRESS NOTES
[address]       Physical Therapy  Cancellation/No-show Note  Patient Name:  Angelic Monge  :  1970   Date:  2017  Referring Practitioner: Leopold Bathe, NP   Diagnosis: dizziness, neck pain on R side    Visit Information:  PT Visit Information  Onset Date:  (3 wks ago )  PT Insurance Information: [employer]   Total # of Visits Approved: 30  Total # of Visits to Date: 12  No Show: 0  Canceled Appointment: 1  Progress Note Counter: +3-6=15-18- pt cx    For today's appointment patient:  [x]  Cancelled  []  Rescheduled appointment  []  No-show   []  Called pt to remind of next appointment     Reason given by patient:  []  Patient ill  []  Conflicting appointment  [x]  No transportation    []  Conflict with work  []  No reason given  []  Other:       Comments:       Signature: Electronically signed by Manohar Jane PT on 17 at 7:48 AM

## 2017-12-02 ENCOUNTER — HOSPITAL ENCOUNTER (OUTPATIENT)
Dept: ULTRASOUND IMAGING | Age: 47
Discharge: HOME | End: 2017-12-02
Payer: COMMERCIAL

## 2017-12-02 DIAGNOSIS — M79.89 LEG SWELLING: ICD-10-CM

## 2017-12-02 PROCEDURE — 93970 EXTREMITY STUDY: CPT

## 2017-12-05 ENCOUNTER — HOSPITAL ENCOUNTER (OUTPATIENT)
Dept: PHYSICAL THERAPY | Age: 47
Setting detail: THERAPIES SERIES
Discharge: HOME | End: 2017-12-05
Payer: COMMERCIAL

## 2017-12-05 PROCEDURE — G0283 ELEC STIM OTHER THAN WOUND: HCPCS

## 2017-12-05 PROCEDURE — 97110 THERAPEUTIC EXERCISES: CPT

## 2017-12-05 PROCEDURE — 97140 MANUAL THERAPY 1/> REGIONS: CPT

## 2017-12-05 ASSESSMENT — PAIN SCALES - GENERAL: PAINLEVEL_OUTOF10: 4

## 2017-12-05 ASSESSMENT — PAIN DESCRIPTION - ORIENTATION: ORIENTATION: RIGHT;LEFT

## 2017-12-05 ASSESSMENT — PAIN DESCRIPTION - DESCRIPTORS: DESCRIPTORS: ACHING;SORE

## 2017-12-05 ASSESSMENT — PAIN DESCRIPTION - LOCATION: LOCATION: NECK

## 2017-12-05 NOTE — PROGRESS NOTES
[address] [address]  Outpatient Physical Therapy    Treatment Note        Date: 2017  Patient: Sekou Ro  : 1970  ACCT #: [de-identified]  Referring Practitioner: Yolie Lindquist NP   Diagnosis: dizziness, neck pain on R side    Visit Information:  PT Visit Information  Onset Date:  (3 wks ago )  PT [facility] [place] [facility]   Total # of Visits Approved: 30  Total # of Visits to Date: 13  No Show: 0  Canceled Appointment: 2  Progress Note Counter:     Subjective: Pt reports contd pain R>L neck, compliant with hep      HEP Compliance:  [x] Good [] Fair [] Poor [] Reports not doing due to:    Vital Signs  Patient Currently in Pain: Yes   Pain Screening  Patient Currently in Pain: Yes  Pain Assessment  Pain Assessment: 0-10  Pain Level: 4  Pain Location: Neck  Pain Orientation: Right;Left (R>L)  Pain Descriptors: Aching; Sore    OBJECTIVE:   Exercises  Exercise 1: prone horiz abd IR/ER x10   Exercise 2: tband rows/lats GTB 2x10   Exercise 3: UT stretch 30\"x3   Exercise 5: prone LT x10 L 1  Exercise 6: Chest pulls RTB 3 way x15  Exercise 7: B tband ER RTB x15     Strength: [x] NT  [] MMT completed:    ROM: [x] NT  [] ROM measurements:     Manual:   Manual therapy  Soft Tissue Mobalization: STM cervical paraspinals, SCM's, UT's, LS, sub-occipital release; IDN to normalize tissue dysfunction and improve NM mechanics (see homeostatic chart to be scanned into EMR upon D/C for points needled) x15 min; Total manual 24 min     Modalities:  Modalities  Moist heat: 10' to B UT to decrease pain and tightness  E-stim (parameters): Estim to cervical 10' with HP in seated to decrease pain     *Indicates exercise, modality, or manual techniques to be initiated when appropriate    Assessment:    Body structures, Functions, Activity limitations: Decreased functional mobility , Decreased strength, Decreased ROM, Decreased sensation, Decreased balance, Decreased coordination  Assessment: Pt reports having min Signature:  Electronically signed by Raissa Mendoza, PT on 12/5/17 at 9:55 AM

## 2017-12-14 ENCOUNTER — HOSPITAL ENCOUNTER (OUTPATIENT)
Dept: PHYSICAL THERAPY | Age: 47
Setting detail: THERAPIES SERIES
Discharge: HOME | End: 2017-12-14
Payer: COMMERCIAL

## 2017-12-14 NOTE — PROGRESS NOTES
[address]       Physical Therapy  Cancellation/No-show Note  Patient Name:  Kira Downey  :  1970   Date:  2017  Referring Practitioner: Keven Pierre NP   Diagnosis: dizziness, neck pain on R side    Visit Information:  PT Visit Information  Onset Date:  (3 wks ago )  PT Insurance Information: [employer]   Total # of Visits Approved: 30  Total # of Visits to Date: 13  No Show: 0  Canceled Appointment: 3  Progress Note Counter:  (cx on )    For today's appointment patient:  [x]  Cancelled  []  Rescheduled appointment  []  No-show   []  Called pt to remind of next appointment     Reason given by patient:  []  Patient ill  []  Conflicting appointment  []  No transportation    [x]  Conflict with work  []  No reason given  []  Other:       Comments:       Signature: Electronically signed by Robert Brantley PTA on 17 at 7:41 AM

## 2017-12-18 ENCOUNTER — HOSPITAL ENCOUNTER (OUTPATIENT)
Dept: PHYSICAL THERAPY | Age: 47
Setting detail: THERAPIES SERIES
Discharge: HOME | End: 2017-12-18
Payer: COMMERCIAL

## 2017-12-18 PROCEDURE — G0283 ELEC STIM OTHER THAN WOUND: HCPCS

## 2017-12-18 PROCEDURE — 97140 MANUAL THERAPY 1/> REGIONS: CPT

## 2017-12-18 PROCEDURE — 97110 THERAPEUTIC EXERCISES: CPT

## 2017-12-18 ASSESSMENT — PAIN DESCRIPTION - DESCRIPTORS: DESCRIPTORS: ACHING

## 2017-12-18 ASSESSMENT — PAIN DESCRIPTION - LOCATION: LOCATION: NECK

## 2017-12-18 ASSESSMENT — PAIN DESCRIPTION - ORIENTATION: ORIENTATION: RIGHT;LEFT

## 2017-12-18 ASSESSMENT — PAIN SCALES - GENERAL: PAINLEVEL_OUTOF10: 4

## 2017-12-20 ENCOUNTER — HOSPITAL ENCOUNTER (OUTPATIENT)
Dept: PHYSICAL THERAPY | Age: 47
Setting detail: THERAPIES SERIES
Discharge: HOME | End: 2017-12-20
Payer: COMMERCIAL

## 2017-12-20 PROCEDURE — 97110 THERAPEUTIC EXERCISES: CPT

## 2017-12-20 PROCEDURE — G0283 ELEC STIM OTHER THAN WOUND: HCPCS

## 2017-12-20 PROCEDURE — 97140 MANUAL THERAPY 1/> REGIONS: CPT

## 2017-12-20 ASSESSMENT — PAIN DESCRIPTION - ORIENTATION: ORIENTATION: RIGHT

## 2017-12-20 ASSESSMENT — PAIN DESCRIPTION - LOCATION: LOCATION: NECK;HEAD

## 2017-12-20 ASSESSMENT — PAIN DESCRIPTION - DESCRIPTORS: DESCRIPTORS: ACHING

## 2017-12-20 ASSESSMENT — PAIN SCALES - GENERAL: PAINLEVEL_OUTOF10: 6

## 2017-12-20 NOTE — PROGRESS NOTES
Individual Minutes  Time In: 3345  Time Out: 1008  Minutes: 50  Timed Code Treatment Minutes: 38 Minutes  Procedure Minutes: 10 minutes     Signature:  Electronically signed by Paola Hay PT on 12/20/17 at 9:56 AM

## 2017-12-26 ENCOUNTER — HOSPITAL ENCOUNTER (OUTPATIENT)
Dept: PHYSICAL THERAPY | Age: 47
Setting detail: THERAPIES SERIES
Discharge: HOME | End: 2017-12-26
Payer: COMMERCIAL

## 2017-12-26 PROCEDURE — G0283 ELEC STIM OTHER THAN WOUND: HCPCS

## 2017-12-26 PROCEDURE — 97140 MANUAL THERAPY 1/> REGIONS: CPT

## 2017-12-26 PROCEDURE — 97110 THERAPEUTIC EXERCISES: CPT

## 2017-12-26 ASSESSMENT — PAIN DESCRIPTION - LOCATION: LOCATION: NECK

## 2017-12-26 ASSESSMENT — PAIN SCALES - GENERAL: PAINLEVEL_OUTOF10: 2

## 2017-12-26 ASSESSMENT — PAIN DESCRIPTION - DESCRIPTORS: DESCRIPTORS: ACHING

## 2017-12-26 ASSESSMENT — PAIN DESCRIPTION - ORIENTATION: ORIENTATION: RIGHT

## 2017-12-26 NOTE — PROGRESS NOTES
[address] [address]  Outpatient Physical Therapy    Treatment Note        Date: 2017  Patient: [place] Tuan  : 1970  ACCT #: [de-identified]  Referring Practitioner: Nicolasa Blanco NP   Diagnosis: dizziness, neck pain on R side    Visit Information:  PT Visit Information  Onset Date:  (3 wks ago )  PT Insurance Information: [employer]   Total # of Visits Approved: 30  Total # of Visits to Date: 16  No Show: 0  Canceled Appointment: 3  Progress Note Counter:     Subjective: Pt reports decreased pain this date, was a little sore after last appt however decreased after a few days with decreased frequency of HA's; contd intermimttent difficulty with driving with turning head to the right d/t pain. Current fucntional level 90%. HEP Compliance:  [x] Good [] Fair [] Poor [] Reports not doing due to:    Vital Signs  Patient Currently in Pain: Yes   Pain Screening  Patient Currently in Pain: Yes  Pain Assessment  Pain Assessment: 0-10  Pain Level: 2  Pain Location: Neck  Pain Orientation: Right  Pain Descriptors: Aching    OBJECTIVE:   Exercises  Exercise 1: prone horiz abd IR/ER x15  Exercise 2: tband rows/lats RTB 2x15   Exercise 4: corner stretch 30\"x3  Exercise 5: prone LT x15 L 1  Exercise 6: Chest pulls RTB horizontal x15  Exercise 7: B tband ER RTB 2x15     Strength: [] NT  [x] MMT completed:  Strength RUE  Comment: MT/LT 4/5 Rhomboid 4+/5  Strength LUE  Comment: MT/LT/ Rhomboid 4/5        ROM: [] NT  [x] ROM measurements:     Spine  Cervical: flex 45, ext 40 SB L 40 R 35 Rot R 64 L 76  (pain with flexion and R rotation )    Manual:   Manual therapy  PROM: R SCM contract/relax stretch 30\"/10\" x3  Soft Tissue Mobalization: STM cervical paraspinals, SCM's, UT's, LS, sub-occipital release R>L; IDN to normalize tissue dysfunction and improve NM mechanics (see homeostatic chart to be scanned into EMR upon D/C for points needled) x8 min;  Total manual 20 min     Modalities:  Modalities  Moist heat: 10'

## 2017-12-28 ENCOUNTER — HOSPITAL ENCOUNTER (OUTPATIENT)
Dept: PHYSICAL THERAPY | Age: 47
Setting detail: THERAPIES SERIES
Discharge: HOME | End: 2017-12-28
Payer: COMMERCIAL

## 2017-12-28 NOTE — PROGRESS NOTES
[address]        Physical Therapy  Cancellation/No-show Note  Patient Name:  Jose Alfredo Leiva  :  1970   Date:  2017  Referring Practitioner: Yaneth Branch NP   Diagnosis: dizziness, neck pain on R side    Visit Information:  PT Visit Information  Onset Date:  (3 wks ago )  PT Insurance Information: [employer]   Total # of Visits Approved: 30  Total # of Visits to Date: 12  Canceled Appointment: 4  Progress Note Counter:  ( Cx 17)    For today's appointment patient:  [x]  Cancelled  []  Rescheduled appointment  []  No-show   []  Called pt to remind of next appointment     Reason given by patient:  []  Patient ill  []  Conflicting appointment  []  No transportation    [x]  Conflict with work  []  No reason given  []  Other:       Comments:       Signature: Electronically signed by Ivette Boothe PT on 17 at 8:32 AM

## 2018-01-19 ENCOUNTER — CLINICAL DOCUMENTATION (OUTPATIENT)
Dept: PHYSICAL THERAPY | Age: 48
End: 2018-01-19

## 2018-02-08 NOTE — PROGRESS NOTES
[address]     Ph: [phone]  Fax: [phone]    [] Certification  [] Recertification []  Plan of Care  [] Progress Note [x] Discharge      To:  Referring Practitioner: Nancy Gonzalez NP       From:  David Lopez, PT, DPT  Patient: Melissa Valladares     : 1970  Diagnosis: dizziness, neck pain on R side     Date: 2018  Treatment Diagnosis: neck pain      Progress Report Period from:  2017  to 2018    Total # of Visits to Date: 16   No Show: 1    Canceled Appointment: 4     OBJECTIVE:   Short Term Goals - Time Frame for Short term goals: 2 wks     Goals Current/Discharge status  Met   Short term goal 1: Independent with HEP   Indep [x] yes  [] no   Short term goal 3: The pt will have a decrease in NDI score >/=4 points in order to increase functional activity tolerance  NT secondary to unexpected D/C [] yes  [] no   Short term goal 4: The pt will demonstrate improved B periscapular strength >/=4+/5 in order to increase postural awareness in sitting   Strength RUE  Comment: MT/LT 4/5 Rhomboid 4+/5  Strength LUE  Comment: MT/LT/ Rhomboid 4/5 (17) [x] yes  [x] no   Short term goal 5: The pt will demo improved deep neck flexor endurance >/=10 seconds in order to decrease pain with functional activity NT secondary to unexpected D/C []  yes  []  no     Long Term Goals - Time Frame for Long term goals : 5 wks   Goals Current/ Discharge status Met   Long term goal 1: Improve cervical ROM WFL to allow increased ease with driving  Cervical: flex 45, ext 40 SB L 40 R 35 Rot R 64 L 76  (pain with flexion and R rotation ) (17) [x] yes  [x] no     Assessment: Pt failed to return to PT after 18, unable to determine functional outcomes at this time. ? Patient Status:[] Continue/ Initiate plan of Care    [x] Discharge PT. Recommend pt continue with HEP.      [] Additional visits requested, Please re-certify for additional visits:          Signature: Electronically signed by Coleen Blum PT on 2/8/18 at 2:12 PM      If you have any questions or concerns, please don't hesitate to call. Thank you for your referral.    I have reviewed this plan of care and certify a need for medically necessary rehabilitation services.     Physician Signature:__________________________________________________________  Date:  Please sign and return

## 2018-03-18 ENCOUNTER — HOSPITAL ENCOUNTER (EMERGENCY)
Age: 48
Discharge: HOME | End: 2018-03-18
Attending: FAMILY MEDICINE
Payer: COMMERCIAL

## 2018-03-18 VITALS
OXYGEN SATURATION: 98 % | WEIGHT: 207 LBS | DIASTOLIC BLOOD PRESSURE: 80 MMHG | TEMPERATURE: 98 F | RESPIRATION RATE: 16 BRPM | HEART RATE: 74 BPM | HEIGHT: 61 IN | BODY MASS INDEX: 39.08 KG/M2 | SYSTOLIC BLOOD PRESSURE: 125 MMHG

## 2018-03-18 DIAGNOSIS — R31.9 HEMATURIA, UNSPECIFIED TYPE: ICD-10-CM

## 2018-03-18 DIAGNOSIS — N30.01 ACUTE HEMORRHAGIC CYSTITIS: Primary | ICD-10-CM

## 2018-03-18 LAB
BACTERIA: ABNORMAL /HPF
BILIRUBIN URINE: NEGATIVE
BLOOD, URINE: ABNORMAL
CLARITY: ABNORMAL
COLOR: YELLOW
EPITHELIAL CELLS, UA: ABNORMAL /HPF
GLUCOSE URINE: NEGATIVE MG/DL
KETONES, URINE: ABNORMAL MG/DL
LEUKOCYTE ESTERASE, URINE: NEGATIVE
NITRITE, URINE: NEGATIVE
PH UA: 6 (ref 5–9)
PROTEIN UA: NEGATIVE MG/DL
RBC UA: ABNORMAL /HPF (ref 0–2)
RENAL EPITHELIAL, UA: ABNORMAL /HPF
SPECIFIC GRAVITY UA: 1.03 (ref 1–1.03)
URINE REFLEX TO CULTURE: YES
UROBILINOGEN, URINE: 1 E.U./DL
WBC UA: ABNORMAL /HPF (ref 0–5)

## 2018-03-18 PROCEDURE — 87086 URINE CULTURE/COLONY COUNT: CPT

## 2018-03-18 PROCEDURE — 81001 URINALYSIS AUTO W/SCOPE: CPT

## 2018-03-18 PROCEDURE — 99283 EMERGENCY DEPT VISIT LOW MDM: CPT

## 2018-03-18 RX ORDER — CIPROFLOXACIN 500 MG/1
500 TABLET, FILM COATED ORAL 2 TIMES DAILY
Qty: 14 TABLET | Refills: 0 | Status: SHIPPED | OUTPATIENT
Start: 2018-03-18 | End: 2018-03-25

## 2018-03-18 ASSESSMENT — PAIN SCALES - GENERAL
PAINLEVEL_OUTOF10: 5
PAINLEVEL_OUTOF10: 0

## 2018-03-18 ASSESSMENT — ENCOUNTER SYMPTOMS
RESPIRATORY NEGATIVE: 1
EYES NEGATIVE: 1

## 2018-03-18 ASSESSMENT — PAIN DESCRIPTION - DESCRIPTORS: DESCRIPTORS: PRESSURE

## 2018-03-19 NOTE — ED PROVIDER NOTES
[address] [facility] ED  eMERGENCY dEPARTMENT eNCOUnter      Pt Name: Ame Avila  MRN: [id number]  [place] 1970  Date of evaluation: 3/18/2018  Provider: Ayden Holt MD    CHIEF COMPLAINT       Chief Complaint   Patient presents with    Hematuria     Pt had hysterectomy 04/2016 and yesterday began spotting and now bleeding today         HISTORY OF PRESENT ILLNESS   (Location/Symptom, Timing/Onset, Context/Setting, Quality, Duration, Modifying Factors, Severity)  Note limiting factors. Ame Avila is a 51 y.o. female who presents to the emergency department   Blood in the urine time x 2 days   Presents with blood in the urine in the last 48 hour period some frequency but no dysuria or urgency deny any fevers chills body ache deny any other complaint or concern      The history is provided by the patient. Nursing Notes were reviewed. REVIEW OF SYSTEMS    (2-9 systems for level 4, 10 or more for level 5)     Review of Systems   Constitutional: Negative. HENT: Negative. Eyes: Negative. Respiratory: Negative. Cardiovascular: Negative. Negative for chest pain, palpitations and leg swelling. Gastrointestinal: Negative. Negative for abdominal distention, abdominal pain, anal bleeding, blood in stool, constipation, diarrhea, nausea, rectal pain and vomiting. Endocrine: Negative. Genitourinary: Positive for hematuria and vaginal bleeding. Negative for decreased urine volume, difficulty urinating, dyspareunia, dysuria, pelvic pain, urgency, vaginal discharge and vaginal pain. Musculoskeletal: Negative. Neurological: Negative. Hematological: Negative. All other systems reviewed and are negative. Except as noted above the remainder of the review of systems was reviewed and negative.        PAST MEDICAL HISTORY     Past Medical History:   Diagnosis Date    Arthritis     Hypertension          SURGICAL HISTORY       Past Surgical History:   Procedure Laterality Date    [address] GASTRIC BYPASS SURGERY  11/2014    HYSTERECTOMY  04/2016         CURRENT MEDICATIONS       Previous Medications    BIOTIN 10 MG CAPS    Take 10 mg by mouth    CALCIUM CARB-CHOLECALCIFEROL 500-400 MG-UNIT CHEW    Take 1 tablet by mouth    CYANOCOBALAMIN 1000 MCG TABLET    Take 1,000 mcg by mouth    DICLOFENAC SODIUM (VOLTAREN) 1 % GEL    Apply topically    DOCUSATE SODIUM (COLACE) 100 MG CAPSULE    Take 100 mg by mouth    FERROUS GLUCONATE (FERGON) 240 (27 FE) MG TABLET    Take 236 mg by mouth    FLUOCINOLONE ACETONIDE (SYNALAR) 0.01 % EXTERNAL SOLUTION    Apply to affected areas on scalp 2 times per day x 2 weeks and then stop only for itching    FLUTICASONE PROPIONATE (FLOVENT) 100 MCG/BLIST AEPB INHALER    by Nasal route    HYDROCHLOROTHIAZIDE (HYDRODIURIL) 12.5 MG TABLET    Take 12.5 mg by mouth    KETOCONAZOLE (NIZORAL) 2 % CREAM    Apply topically    METHEN-HYOSC-METH BLUE-NA PHOS (UROGESIC-BLUE) 81.6 MG TABS    Take 81.6 mg by mouth 3 times daily as needed (Bladder Spasm)    POTASSIUM CHLORIDE    Take by mouth    SG ENTERIC COATED ASPIRIN PO    Take 81 mg by mouth       ALLERGIES     Patient has no known allergies. FAMILY HISTORY     History reviewed. No pertinent family history.        SOCIAL HISTORY       Social History     Social History    Marital status: Single     Spouse name: N/A    Number of children: N/A    Years of education: N/A     Social History Main Topics    Smoking status: Former Smoker    Smokeless tobacco: Never Used    Alcohol use No    Drug use: No    Sexual activity: No     Other Topics Concern    None     Social History Narrative    None       SCREENINGS             PHYSICAL EXAM    (up to 7 for level 4, 8 or more for level 5)     ED Triage Vitals [03/18/18 1853]   BP Temp Temp Source Pulse Resp SpO2 Height Weight   125/80 98 °F (36.7 °C) Oral 74 16 98 % 5' 1\" (1.549 m) 207 lb (93.9 kg)       Physical Exam   Constitutional: She appears (1.549 m)       ED Course        MDM  Number of Diagnoses or Management Options  Acute hemorrhagic cystitis:   Hematuria, unspecified type:   Diagnosis management comments: Patient presents with blood when she wipes but on exam there was no vaginal bleeding patient have a history of hysterectomy and vaginal exam showed a clean vaginal vault no active bleeding. During confirmed hematuria patient will be treated empirically for acute hemorrhagic cystitis advised to follow-up with urology for hematuria in the next 1-2 days patient verbalized understanding and agreed with the plan       Amount and/or Complexity of Data Reviewed  Clinical lab tests: ordered and reviewed      None    PROCEDURES:  Unless otherwise noted below, none     Procedures    FINAL IMPRESSION      1. Acute hemorrhagic cystitis    2.  Hematuria, unspecified type          DISPOSITION/PLAN   DISPOSITION Decision To Discharge 03/18/2018 08:20:11 PM      PATIENT REFERRED TO:  [place] [address]    In 2 days      Ely Ramos MD  [address] [facility]  [address]  [phone]            DISCHARGE MEDICATIONS:  New Prescriptions    CIPROFLOXACIN (CIPRO) 500 MG TABLET    Take 1 tablet by mouth 2 times daily for 7 days          (Please note that portions of this note were completed with a voice recognition program.  Efforts were made to edit the dictations but occasionally words are mis-transcribed.)    Kela Dancer, MD (electronically signed)  Attending Emergency Physician          Oskar Sesay MD  03/18/18 2022       Oskar Sesay MD  03/25/18 2102

## 2018-03-20 ENCOUNTER — OFFICE VISIT (OUTPATIENT)
Dept: UROLOGY | Age: 48
End: 2018-03-20
Payer: COMMERCIAL

## 2018-03-20 VITALS
HEIGHT: 61 IN | HEART RATE: 74 BPM | SYSTOLIC BLOOD PRESSURE: 132 MMHG | DIASTOLIC BLOOD PRESSURE: 88 MMHG | WEIGHT: 207 LBS | BODY MASS INDEX: 39.08 KG/M2

## 2018-03-20 DIAGNOSIS — R31.0 GROSS HEMATURIA: Primary | ICD-10-CM

## 2018-03-20 LAB
BILIRUBIN, POC: NORMAL
BLOOD URINE, POC: NORMAL
CLARITY, POC: CLEAR
COLOR, POC: YELLOW
GLUCOSE URINE, POC: NORMAL
KETONES, POC: NORMAL
LEUKOCYTE EST, POC: NORMAL
NITRITE, POC: NORMAL
PH, POC: 5
PROTEIN, POC: NORMAL
SPECIFIC GRAVITY, POC: >=1.03
URINE CULTURE, ROUTINE: NORMAL
UROBILINOGEN, POC: 0.2

## 2018-03-20 PROCEDURE — G8427 DOCREV CUR MEDS BY ELIG CLIN: HCPCS | Performed by: UROLOGY

## 2018-03-20 PROCEDURE — G8417 CALC BMI ABV UP PARAM F/U: HCPCS | Performed by: UROLOGY

## 2018-03-20 PROCEDURE — 81003 URINALYSIS AUTO W/O SCOPE: CPT | Performed by: UROLOGY

## 2018-03-20 PROCEDURE — 99214 OFFICE O/P EST MOD 30 MIN: CPT | Performed by: UROLOGY

## 2018-03-20 PROCEDURE — 1036F TOBACCO NON-USER: CPT | Performed by: UROLOGY

## 2018-03-20 PROCEDURE — G8484 FLU IMMUNIZE NO ADMIN: HCPCS | Performed by: UROLOGY

## 2018-03-20 ASSESSMENT — PATIENT HEALTH QUESTIONNAIRE - PHQ9
SUM OF ALL RESPONSES TO PHQ QUESTIONS 1-9: 0
2. FEELING DOWN, DEPRESSED OR HOPELESS: 0
1. LITTLE INTEREST OR PLEASURE IN DOING THINGS: 0
SUM OF ALL RESPONSES TO PHQ9 QUESTIONS 1 & 2: 0

## 2018-03-20 NOTE — PROGRESS NOTES
[facility] EVALUATION NOTE                                                 H&P                                                                                                                                                 Reason for Visit  Gross hematuria    History of Present Illness  Gross hematuria secondary to UTI  Denies history of stones  Recent hysterectomy 2016  Denies urinary incontinence      Urologic Review of Systems/Symptoms  Denies hematuria  Denies dysuria  Denies incontinence  Denies flank pain  Other Urologic: No previous UTIs    Review of Systems  Head and neck: No issues/reviewed  Cardiac: No recent issues/reviewed  Pulmonary: No issues/reviewed  Gastrointestinal: No issues/reviewed  Neurologic: No recent issues/reviewed  Extremities: No issues/reviewed  Lymphatics: No lymphadenopathy no change  Genitourinary: See above  Skin: No issues/reviewed  Hospitalization: None recent  No aspirin in med list  All 14 categories of Review of Systems otherwise reviewed no other findings reported. Past Medical History:   Diagnosis Date    Arthritis     Hypertension      Past Surgical History:   Procedure Laterality Date     SECTION      GASTRIC BYPASS SURGERY  2014    HYSTERECTOMY  2016     Social History     Social History    Marital status: Single     Spouse name: N/A    Number of children: N/A    Years of education: N/A     Social History Main Topics    Smoking status: Former Smoker    Smokeless tobacco: Never Used    Alcohol use No    Drug use: No    Sexual activity: No     Other Topics Concern    None     Social History Narrative    None     History reviewed. No pertinent family history.   Current Outpatient Prescriptions   Medication Sig Dispense Refill    ciprofloxacin (CIPRO) 500 MG tablet Take 1 tablet by mouth 2 times daily for 7 days 14 tablet 0    SG ENTERIC COATED ASPIRIN PO Take 81 mg by mouth      Biotin 10 MG CAPS Take 10 mg by mouth      Calcium

## 2018-03-25 ASSESSMENT — ENCOUNTER SYMPTOMS
NAUSEA: 0
GASTROINTESTINAL NEGATIVE: 1
BLOOD IN STOOL: 0
ABDOMINAL DISTENTION: 0
ABDOMINAL PAIN: 0
CONSTIPATION: 0
VOMITING: 0
ANAL BLEEDING: 0
DIARRHEA: 0
RECTAL PAIN: 0

## 2018-03-28 ENCOUNTER — HOSPITAL ENCOUNTER (OUTPATIENT)
Dept: GENERAL RADIOLOGY | Age: 48
Discharge: HOME | End: 2018-03-30
Payer: COMMERCIAL

## 2018-03-28 ENCOUNTER — HOSPITAL ENCOUNTER (OUTPATIENT)
Dept: CT IMAGING | Age: 48
Discharge: HOME | End: 2018-03-30
Payer: COMMERCIAL

## 2018-03-28 VITALS — HEIGHT: 61 IN | BODY MASS INDEX: 39.08 KG/M2 | WEIGHT: 207 LBS

## 2018-03-28 DIAGNOSIS — R31.0 HEMATURIA, GROSS: ICD-10-CM

## 2018-03-28 DIAGNOSIS — R31.0 GROSS HEMATURIA: ICD-10-CM

## 2018-03-28 LAB
ANION GAP SERPL CALCULATED.3IONS-SCNC: 14 MEQ/L (ref 7–13)
BUN BLDV-MCNC: 16 MG/DL (ref 6–20)
CALCIUM SERPL-MCNC: 9.1 MG/DL (ref 8.6–10.2)
CHLORIDE BLD-SCNC: 100 MEQ/L (ref 98–107)
CO2: 25 MEQ/L (ref 22–29)
CREAT SERPL-MCNC: 0.64 MG/DL (ref 0.5–0.9)
GFR AFRICAN AMERICAN: >60
GFR NON-AFRICAN AMERICAN: >60
GLUCOSE BLD-MCNC: 82 MG/DL (ref 74–109)
POTASSIUM SERPL-SCNC: 4.3 MEQ/L (ref 3.5–5.1)
SODIUM BLD-SCNC: 139 MEQ/L (ref 132–144)

## 2018-03-28 PROCEDURE — 74018 RADEX ABDOMEN 1 VIEW: CPT

## 2018-03-28 PROCEDURE — 2580000003 HC RX 258: Performed by: UROLOGY

## 2018-03-28 PROCEDURE — 6360000004 HC RX CONTRAST MEDICATION: Performed by: UROLOGY

## 2018-03-28 PROCEDURE — 74178 CT ABD&PLV WO CNTR FLWD CNTR: CPT

## 2018-03-28 RX ORDER — SODIUM CHLORIDE 0.9 % (FLUSH) 0.9 %
10 SYRINGE (ML) INJECTION ONCE
Status: COMPLETED | OUTPATIENT
Start: 2018-03-28 | End: 2018-03-28

## 2018-03-28 RX ADMIN — IOPAMIDOL 100 ML: 755 INJECTION, SOLUTION INTRAVENOUS at 09:58

## 2018-03-28 RX ADMIN — SODIUM CHLORIDE, PRESERVATIVE FREE 10 ML: 5 INJECTION INTRAVENOUS at 09:58

## 2018-04-05 ENCOUNTER — PROCEDURE VISIT (OUTPATIENT)
Dept: UROLOGY | Age: 48
End: 2018-04-05
Payer: COMMERCIAL

## 2018-04-05 VITALS
HEART RATE: 80 BPM | SYSTOLIC BLOOD PRESSURE: 130 MMHG | HEIGHT: 61 IN | BODY MASS INDEX: 39.08 KG/M2 | WEIGHT: 207 LBS | DIASTOLIC BLOOD PRESSURE: 88 MMHG

## 2018-04-05 DIAGNOSIS — R31.0 GROSS HEMATURIA: Primary | ICD-10-CM

## 2018-04-05 LAB
BILIRUBIN, POC: ABNORMAL
BLOOD URINE, POC: ABNORMAL
CLARITY, POC: CLEAR
COLOR, POC: ABNORMAL
GLUCOSE URINE, POC: ABNORMAL
KETONES, POC: ABNORMAL
LEUKOCYTE EST, POC: ABNORMAL
NITRITE, POC: ABNORMAL
PH, POC: 5.5
PROTEIN, POC: ABNORMAL
SPECIFIC GRAVITY, POC: 1.02
UROBILINOGEN, POC: 0.2

## 2018-04-05 PROCEDURE — 99213 OFFICE O/P EST LOW 20 MIN: CPT | Performed by: UROLOGY

## 2018-04-05 PROCEDURE — 1036F TOBACCO NON-USER: CPT | Performed by: UROLOGY

## 2018-04-05 PROCEDURE — 52000 CYSTOURETHROSCOPY: CPT | Performed by: UROLOGY

## 2018-04-05 PROCEDURE — 81003 URINALYSIS AUTO W/O SCOPE: CPT | Performed by: UROLOGY

## 2018-04-05 PROCEDURE — G8427 DOCREV CUR MEDS BY ELIG CLIN: HCPCS | Performed by: UROLOGY

## 2018-04-05 PROCEDURE — G8417 CALC BMI ABV UP PARAM F/U: HCPCS | Performed by: UROLOGY

## 2018-04-05 RX ORDER — DOXYCYCLINE HYCLATE 100 MG
100 TABLET ORAL ONCE
Qty: 1 TABLET | Refills: 0 | COMMUNITY
Start: 2018-04-05 | End: 2018-04-05

## 2019-02-19 ENCOUNTER — HOSPITAL ENCOUNTER (EMERGENCY)
Age: 49
Discharge: HOME | End: 2019-02-20
Payer: COMMERCIAL

## 2019-02-19 ENCOUNTER — APPOINTMENT (OUTPATIENT)
Dept: GENERAL RADIOLOGY | Age: 49
End: 2019-02-19
Payer: COMMERCIAL

## 2019-02-19 DIAGNOSIS — R07.89 ATYPICAL CHEST PAIN: Primary | ICD-10-CM

## 2019-02-19 LAB
ALBUMIN SERPL-MCNC: 4.4 G/DL (ref 3.5–4.6)
ALP BLD-CCNC: 67 U/L (ref 40–130)
ALT SERPL-CCNC: 20 U/L (ref 0–33)
AMPHETAMINE SCREEN, URINE: NORMAL
ANION GAP SERPL CALCULATED.3IONS-SCNC: 11 MEQ/L (ref 9–15)
AST SERPL-CCNC: 25 U/L (ref 0–35)
BARBITURATE SCREEN URINE: NORMAL
BASOPHILS ABSOLUTE: 0.1 K/UL (ref 0–0.2)
BASOPHILS RELATIVE PERCENT: 0.9 %
BENZODIAZEPINE SCREEN, URINE: NORMAL
BILIRUB SERPL-MCNC: 0.3 MG/DL (ref 0.2–0.7)
BILIRUBIN URINE: NEGATIVE
BLOOD, URINE: NEGATIVE
BUN BLDV-MCNC: 15 MG/DL (ref 6–20)
CALCIUM SERPL-MCNC: 9.3 MG/DL (ref 8.5–9.9)
CANNABINOID SCREEN URINE: NORMAL
CHLORIDE BLD-SCNC: 103 MEQ/L (ref 95–107)
CLARITY: CLEAR
CO2: 27 MEQ/L (ref 20–31)
COCAINE METABOLITE SCREEN URINE: NORMAL
COLOR: YELLOW
CREAT SERPL-MCNC: 0.62 MG/DL (ref 0.5–0.9)
D DIMER: <0.19 MG/L FEU (ref 0–0.5)
EKG ATRIAL RATE: 62 BPM
EKG P AXIS: 71 DEGREES
EKG P-R INTERVAL: 130 MS
EKG Q-T INTERVAL: 404 MS
EKG QRS DURATION: 88 MS
EKG QTC CALCULATION (BAZETT): 410 MS
EKG R AXIS: 63 DEGREES
EKG T AXIS: 36 DEGREES
EKG VENTRICULAR RATE: 62 BPM
EOSINOPHILS ABSOLUTE: 0.2 K/UL (ref 0–0.7)
EOSINOPHILS RELATIVE PERCENT: 2.7 %
GFR AFRICAN AMERICAN: >60
GFR NON-AFRICAN AMERICAN: >60
GLOBULIN: 3.1 G/DL (ref 2.3–3.5)
GLUCOSE BLD-MCNC: 97 MG/DL (ref 70–99)
GLUCOSE URINE: NEGATIVE MG/DL
HCT VFR BLD CALC: 41.3 % (ref 37–47)
HEMOGLOBIN: 14.1 G/DL (ref 12–16)
KETONES, URINE: ABNORMAL MG/DL
LACTIC ACID: 0.6 MMOL/L (ref 0.5–2.2)
LEUKOCYTE ESTERASE, URINE: NEGATIVE
LYMPHOCYTES ABSOLUTE: 2.6 K/UL (ref 1–4.8)
LYMPHOCYTES RELATIVE PERCENT: 43.2 %
Lab: NORMAL
MAGNESIUM: 2.6 MG/DL (ref 1.7–2.4)
MCH RBC QN AUTO: 30 PG (ref 27–31.3)
MCHC RBC AUTO-ENTMCNC: 34 % (ref 33–37)
MCV RBC AUTO: 88.1 FL (ref 82–100)
MONOCYTES ABSOLUTE: 0.4 K/UL (ref 0.2–0.8)
MONOCYTES RELATIVE PERCENT: 6.1 %
NEUTROPHILS ABSOLUTE: 2.8 K/UL (ref 1.4–6.5)
NEUTROPHILS RELATIVE PERCENT: 47.1 %
NITRITE, URINE: NEGATIVE
OPIATE SCREEN URINE: NORMAL
PDW BLD-RTO: 13.4 % (ref 11.5–14.5)
PH UA: 5.5 (ref 5–9)
PHENCYCLIDINE SCREEN URINE: NORMAL
PLATELET # BLD: 198 K/UL (ref 130–400)
POTASSIUM SERPL-SCNC: 4.3 MEQ/L (ref 3.4–4.9)
PRO-BNP: <5 PG/ML
PROTEIN UA: NEGATIVE MG/DL
RBC # BLD: 4.69 M/UL (ref 4.2–5.4)
SODIUM BLD-SCNC: 141 MEQ/L (ref 135–144)
SPECIFIC GRAVITY UA: 1.03 (ref 1–1.03)
TOTAL CK: 101 U/L (ref 0–170)
TOTAL PROTEIN: 7.5 G/DL (ref 6.3–8)
TROPONIN: <0.01 NG/ML (ref 0–0.01)
URINE REFLEX TO CULTURE: ABNORMAL
UROBILINOGEN, URINE: 0.2 E.U./DL
WBC # BLD: 6 K/UL (ref 4.8–10.8)

## 2019-02-19 PROCEDURE — 85379 FIBRIN DEGRADATION QUANT: CPT

## 2019-02-19 PROCEDURE — 83880 ASSAY OF NATRIURETIC PEPTIDE: CPT

## 2019-02-19 PROCEDURE — 6370000000 HC RX 637 (ALT 250 FOR IP): Performed by: PHYSICIAN ASSISTANT

## 2019-02-19 PROCEDURE — 99285 EMERGENCY DEPT VISIT HI MDM: CPT

## 2019-02-19 PROCEDURE — 36415 COLL VENOUS BLD VENIPUNCTURE: CPT

## 2019-02-19 PROCEDURE — 80307 DRUG TEST PRSMV CHEM ANLYZR: CPT

## 2019-02-19 PROCEDURE — 83605 ASSAY OF LACTIC ACID: CPT

## 2019-02-19 PROCEDURE — 93005 ELECTROCARDIOGRAM TRACING: CPT

## 2019-02-19 PROCEDURE — 82550 ASSAY OF CK (CPK): CPT

## 2019-02-19 PROCEDURE — 83735 ASSAY OF MAGNESIUM: CPT

## 2019-02-19 PROCEDURE — 84484 ASSAY OF TROPONIN QUANT: CPT

## 2019-02-19 PROCEDURE — 80053 COMPREHEN METABOLIC PANEL: CPT

## 2019-02-19 PROCEDURE — 81003 URINALYSIS AUTO W/O SCOPE: CPT

## 2019-02-19 PROCEDURE — 71045 X-RAY EXAM CHEST 1 VIEW: CPT

## 2019-02-19 PROCEDURE — 85025 COMPLETE CBC W/AUTO DIFF WBC: CPT

## 2019-02-19 RX ORDER — ASPIRIN 81 MG/1
324 TABLET, CHEWABLE ORAL ONCE
Status: COMPLETED | OUTPATIENT
Start: 2019-02-19 | End: 2019-02-19

## 2019-02-19 RX ADMIN — ASPIRIN 81 MG 324 MG: 81 TABLET ORAL at 23:26

## 2019-02-19 ASSESSMENT — PAIN DESCRIPTION - PAIN TYPE: TYPE: ACUTE PAIN

## 2019-02-19 ASSESSMENT — PAIN DESCRIPTION - LOCATION: LOCATION: CHEST

## 2019-02-19 ASSESSMENT — PAIN DESCRIPTION - ORIENTATION: ORIENTATION: MID

## 2019-02-19 ASSESSMENT — PAIN DESCRIPTION - DESCRIPTORS: DESCRIPTORS: SHARP;STABBING

## 2019-02-19 ASSESSMENT — PAIN DESCRIPTION - FREQUENCY: FREQUENCY: INTERMITTENT

## 2019-02-19 ASSESSMENT — PAIN SCALES - GENERAL: PAINLEVEL_OUTOF10: 6

## 2019-02-20 VITALS
HEIGHT: 61 IN | TEMPERATURE: 98.1 F | OXYGEN SATURATION: 99 % | RESPIRATION RATE: 18 BRPM | SYSTOLIC BLOOD PRESSURE: 103 MMHG | HEART RATE: 68 BPM | DIASTOLIC BLOOD PRESSURE: 64 MMHG | BODY MASS INDEX: 39.65 KG/M2 | WEIGHT: 210 LBS

## 2019-02-20 ASSESSMENT — ENCOUNTER SYMPTOMS
SHORTNESS OF BREATH: 1
COLOR CHANGE: 0
APNEA: 0
ABDOMINAL PAIN: 0
TROUBLE SWALLOWING: 0
ALLERGIC/IMMUNOLOGIC NEGATIVE: 1
EYE PAIN: 0

## 2019-02-20 ASSESSMENT — HEART SCORE: ECG: 0

## 2019-02-20 ASSESSMENT — PAIN SCALES - GENERAL: PAINLEVEL_OUTOF10: 0
